# Patient Record
Sex: FEMALE | Race: WHITE | NOT HISPANIC OR LATINO | URBAN - METROPOLITAN AREA
[De-identification: names, ages, dates, MRNs, and addresses within clinical notes are randomized per-mention and may not be internally consistent; named-entity substitution may affect disease eponyms.]

---

## 2017-02-20 ENCOUNTER — PROCEDURE VISIT (OUTPATIENT)
Dept: OBSTETRICS AND GYNECOLOGY | Facility: CLINIC | Age: 27
End: 2017-02-20

## 2017-02-20 ENCOUNTER — OFFICE VISIT (OUTPATIENT)
Dept: OBSTETRICS AND GYNECOLOGY | Facility: CLINIC | Age: 27
End: 2017-02-20

## 2017-02-20 VITALS
WEIGHT: 293 LBS | BODY MASS INDEX: 44.41 KG/M2 | HEIGHT: 68 IN | DIASTOLIC BLOOD PRESSURE: 80 MMHG | SYSTOLIC BLOOD PRESSURE: 130 MMHG

## 2017-02-20 DIAGNOSIS — O99.330 TOBACCO SMOKING AFFECTING PREGNANCY: ICD-10-CM

## 2017-02-20 DIAGNOSIS — Z36.87 UNSURE OF LMP (LAST MENSTRUAL PERIOD) AS REASON FOR ULTRASOUND SCAN: ICD-10-CM

## 2017-02-20 DIAGNOSIS — Z98.891 HISTORY OF CESAREAN DELIVERY: ICD-10-CM

## 2017-02-20 DIAGNOSIS — Z34.80 ENCOUNTER FOR SUPERVISION OF OTHER NORMAL PREGNANCY: ICD-10-CM

## 2017-02-20 DIAGNOSIS — Z34.81 PRENATAL CARE, SUBSEQUENT PREGNANCY, FIRST TRIMESTER: ICD-10-CM

## 2017-02-20 DIAGNOSIS — O99.210 OBESITY AFFECTING PREGNANCY: ICD-10-CM

## 2017-02-20 DIAGNOSIS — Z13.9 SCREENING: ICD-10-CM

## 2017-02-20 DIAGNOSIS — Z01.419 WELL FEMALE EXAM WITH ROUTINE GYNECOLOGICAL EXAM: Primary | ICD-10-CM

## 2017-02-20 DIAGNOSIS — Z11.3 SCREEN FOR STD (SEXUALLY TRANSMITTED DISEASE): ICD-10-CM

## 2017-02-20 DIAGNOSIS — Z12.4 PAP SMEAR FOR CERVICAL CANCER SCREENING: ICD-10-CM

## 2017-02-20 PROBLEM — Z34.90 SUPERVISION OF NORMAL PREGNANCY: Status: ACTIVE | Noted: 2017-02-20

## 2017-02-20 LAB
B-HCG UR QL: POSITIVE
BILIRUB BLD-MCNC: ABNORMAL MG/DL
CBC, PLATELET CT, AND DIFF: (no result)
CLARITY, POC: CLEAR
COLOR UR: YELLOW
EXTERNAL CYSTIC FIBROSIS: NORMAL
EXTERNAL GC/CHLAMYDIA: NORMAL
EXTERNAL HEPATITIS B SURFACE ANTIGEN: NEGATIVE
EXTERNAL HEPATITIS C AB: NEGATIVE
EXTERNAL RUBELLA QUALITATIVE: NORMAL
EXTERNAL SYPHILIS RPR SCREEN: NEGATIVE
EXTERNAL THINPREP: NEGATIVE
EXTERNAL URINE CULTURE: NORMAL
GLUCOSE UR STRIP-MCNC: NEGATIVE MG/DL
HEMOGLOBIN FRACTIONATION: NORMAL
HIV1 AB SPEC QL IA.RAPID: NEGATIVE
INTERNAL NEGATIVE CONTROL: NEGATIVE
INTERNAL POSITIVE CONTROL: POSITIVE
KETONES UR QL: NEGATIVE
LEUKOCYTE EST, POC: ABNORMAL
Lab: ABNORMAL
NITRITE UR-MCNC: NEGATIVE MG/ML
PH UR: 7 [PH] (ref 5–8)
PROT UR STRIP-MCNC: NEGATIVE MG/DL
RBC # UR STRIP: NEGATIVE /UL
SP GR UR: 1.02 (ref 1–1.03)
UROBILINOGEN UR QL: NORMAL
VZV IGG SER QL: NORMAL

## 2017-02-20 PROCEDURE — 81002 URINALYSIS NONAUTO W/O SCOPE: CPT | Performed by: OBSTETRICS & GYNECOLOGY

## 2017-02-20 PROCEDURE — 76817 TRANSVAGINAL US OBSTETRIC: CPT | Performed by: OBSTETRICS & GYNECOLOGY

## 2017-02-20 PROCEDURE — 99395 PREV VISIT EST AGE 18-39: CPT | Performed by: OBSTETRICS & GYNECOLOGY

## 2017-02-20 PROCEDURE — 81025 URINE PREGNANCY TEST: CPT | Performed by: OBSTETRICS & GYNECOLOGY

## 2017-02-20 NOTE — PROGRESS NOTES
[facility]  Ultrasound Note     2017    Patient:  Debra Moreno      MR#:[id number]    26 y.o.      Patient Active Problem List   Diagnosis   • Well female exam with routine gynecological exam   • Supervision of normal pregnancy   • Tobacco smoking affecting pregnancy   • History of  delivery   • Obesity affecting pregnancy       [See the scanned report in the media tab for more details]    Impression    1.  Intrauterine pregnancy at 10w3d  2.  EDC 9/15/17 by LMP c/w US  3.  Viable fetus heart rate 165    Angel Barrios MD  2017 1:11 PM

## 2017-02-20 NOTE — PROGRESS NOTES
[facility]  Routine Annual Visit    2017    Patient: Debra Moreno          MR#:[id number]      History of Present Illness    26 y.o. female  who presents for annual exam.    Patient here today for confirmation of pregnancy.  Last menstrual period reportedly 16 with an EDC 9/15/17 making her 10 weeks and 3 days.  Patient with associated symptoms of nausea without a lot of vomiting and breast tenderness    Patient's last menstrual period was 2016 (exact date).  Obstetric History:  OB History      Para Term  AB TAB SAB Ectopic Multiple Living    2 1 1       1         Menstrual History:     Patient's last menstrual period was 2016 (exact date).       Sexual History:       ________________________________________  Patient Active Problem List   Diagnosis   • Well female exam with routine gynecological exam   • Supervision of normal pregnancy   • Tobacco smoking affecting pregnancy   • History of  delivery   • Obesity affecting pregnancy       Past Medical History   Diagnosis Date   • Anemia    • Bacterial vaginal infection 10/2015   • GC (gonococcus infection)    • Hypertension 2012     GESTATIONAL HYPERTENSION   • Obesity        Past Surgical History   Procedure Laterality Date   •  section         History   Smoking Status   • Current Every Day Smoker   • Packs/day: 0.50   • Types: Cigarettes   Smokeless Tobacco   • Never Used       Family History   Problem Relation Age of Onset   • Hypertension Maternal Grandmother    • Hypertension Maternal Grandfather    • Diabetes Other    • Diabetes Paternal Grandfather        Prior to Admission medications    Medication Sig Start Date End Date Taking? Authorizing Provider   Prenatal MV-Min-Fe Fum-FA-DHA (PRENATAL 1 PO) Take 1 tablet by mouth Daily.   Yes Historical Provider, MD     ________________________________________    Current contraception: none  History of abnormal Pap smear:  "no  Family history of uterine or ovarian cancer: no  Family History of colon cancer/colon polyps: no  History of abnormal mammogram: no  History of abnormal lipids: no    The following portions of the patient's history were reviewed and updated as appropriate: allergies, current medications, past family history, past medical history, past social history, past surgical history and problem list.    Review of Systems    Pertinent items are noted in HPI.     Objective   Physical Exam    Visit Vitals   • /80   • Ht 68\" (172.7 cm)   • Wt (!) 317 lb (144 kg)   • LMP 12/09/2016 (Exact Date)   • Breastfeeding No   • BMI 48.2 kg/m2      BP Readings from Last 3 Encounters:   02/20/17 130/80      Wt Readings from Last 3 Encounters:   02/20/17 (!) 317 lb (144 kg)        BMI: Estimated body mass index is 48.2 kg/(m^2) as calculated from the following:    Height as of this encounter: 68\" (172.7 cm).    Weight as of this encounter: 317 lb (144 kg).      Is Debra Moreno ready to make a healthy lifestyle change today? Yes, readiness to change = 6    The priority health goal(s) that the family would like to work on are: eat more fruits and vegetables, decrease soda or juice intake, increase water intake, increase physical activity, reduce screen time, reduce portion size and cut out extra servings    The family and Debra Moreno are at a Yes, readiness to change = 5 on the Confidence scale.        General:   alert, appears stated age and cooperative   Heart: regular rate and rhythm, S1, S2 normal, no murmur, click, rub or gallop   Lungs: clear to auscultation bilaterally   Abdomen: soft, non-tender, without masses or organomegaly and obese   Breast: inspection negative, no nipple discharge or bleeding, no masses or nodularity palpable   Vulva: normal   Vagina: normal mucosa   Cervix: no lesions   Uterus: normal size or marked limited habitus   Adnexa: exam limited by habitus     Assessment:    1.  Normal annual exam "   2.  Pregnancy with undetermined viability  3.  Morbid obesity  4.  History of  section  5.  Tobacco use affecting pregnancy-counseled on reduction or cessation      Plan:    []  Rx:   []  Mammogram request made  [x]  PAP done  []  Occult fecal blood test (Insure)  [x]  Labs: Prenatal lab panel, dating ultrasound for viability   [x]  GC/Chl/TV  []  DEXA scan   []  Referral for colonoscopy:   []  Encouraged/discussed daily MELY Barrios MD  2017 11:27 AM

## 2017-02-21 LAB
ABO GROUP BLD: NORMAL
BLD GP AB SCN SERPL QL: NEGATIVE
ERYTHROCYTE [DISTWIDTH] IN BLOOD BY AUTOMATED COUNT: 13.3 % (ref 12.3–15.4)
HBA1C MFR BLD: 5.7 % (ref 4.8–5.6)
HBV SURFACE AG SERPL QL IA: NEGATIVE
HCT VFR BLD AUTO: 39.4 % (ref 34–46.6)
HCV AB S/CO SERPL IA: <0.1 S/CO RATIO (ref 0–0.9)
HGB A MFR BLD: 97.7 % (ref 94–98)
HGB A2 MFR BLD COLUMN CHROM: 2.3 % (ref 0.7–3.1)
HGB BLD-MCNC: 13.3 G/DL (ref 11.1–15.9)
HGB C MFR BLD: 0 %
HGB F MFR BLD: 0 % (ref 0–2)
HGB FRACT BLD-IMP: NORMAL
HGB S BLD QL SOLY: NEGATIVE
HGB S MFR BLD: 0 %
HIV 1+2 AB+HIV1 P24 AG SERPL QL IA: NON REACTIVE
MCH RBC QN AUTO: 29.1 PG (ref 26.6–33)
MCHC RBC AUTO-ENTMCNC: 33.8 G/DL (ref 31.5–35.7)
MCV RBC AUTO: 86 FL (ref 79–97)
PLATELET # BLD AUTO: 326 X10E3/UL (ref 150–379)
RBC # BLD AUTO: 4.57 X10E6/UL (ref 3.77–5.28)
RH BLD: POSITIVE
RPR SER QL: NON REACTIVE
RUBV IGG SERPL IA-ACNC: 12.3 INDEX
VZV IGG SER IA-ACNC: 305 INDEX
WBC # BLD AUTO: 9.1 X10E3/UL (ref 3.4–10.8)

## 2017-02-22 ENCOUNTER — TELEPHONE (OUTPATIENT)
Dept: OBSTETRICS AND GYNECOLOGY | Facility: CLINIC | Age: 27
End: 2017-02-22

## 2017-02-22 LAB
BACTERIA UR CULT: NORMAL
BACTERIA UR CULT: NORMAL
C TRACH RRNA SPEC QL NAA+PROBE: NEGATIVE
CONV .: NORMAL
CYTOLOGIST CVX/VAG CYTO: NORMAL
CYTOLOGY CVX/VAG DOC THIN PREP: NORMAL
DX ICD CODE: NORMAL
HIV 1 & 2 AB SER-IMP: NORMAL
N GONORRHOEA RRNA SPEC QL NAA+PROBE: NEGATIVE
OTHER STN SPEC: NORMAL
PATH REPORT.FINAL DX SPEC: NORMAL
STAT OF ADQ CVX/VAG CYTO-IMP: NORMAL
T VAGINALIS RRNA SPEC QL NAA+PROBE: NEGATIVE

## 2017-02-22 NOTE — TELEPHONE ENCOUNTER
----- Message from Angel Barrios MD sent at 2/22/2017 11:39 AM EST -----  Call pt:  PAP is negative.

## 2017-02-23 ENCOUNTER — TELEPHONE (OUTPATIENT)
Dept: OBSTETRICS AND GYNECOLOGY | Facility: CLINIC | Age: 27
End: 2017-02-23

## 2017-03-22 ENCOUNTER — INITIAL PRENATAL (OUTPATIENT)
Dept: OBSTETRICS AND GYNECOLOGY | Facility: CLINIC | Age: 27
End: 2017-03-22

## 2017-03-22 VITALS — BODY MASS INDEX: 48.2 KG/M2 | DIASTOLIC BLOOD PRESSURE: 68 MMHG | WEIGHT: 293 LBS | SYSTOLIC BLOOD PRESSURE: 118 MMHG

## 2017-03-22 DIAGNOSIS — Z34.82 ENCOUNTER FOR SUPERVISION OF OTHER NORMAL PREGNANCY IN SECOND TRIMESTER: Primary | ICD-10-CM

## 2017-03-22 DIAGNOSIS — Z98.891 HISTORY OF CESAREAN DELIVERY: ICD-10-CM

## 2017-03-22 DIAGNOSIS — O99.210 OBESITY AFFECTING PREGNANCY: ICD-10-CM

## 2017-03-22 DIAGNOSIS — O99.330 TOBACCO SMOKING AFFECTING PREGNANCY: ICD-10-CM

## 2017-03-22 DIAGNOSIS — Z13.9 SCREENING: ICD-10-CM

## 2017-03-22 LAB
BILIRUB BLD-MCNC: NEGATIVE MG/DL
CLARITY, POC: CLEAR
COLOR UR: YELLOW
GLUCOSE UR STRIP-MCNC: NEGATIVE MG/DL
KETONES UR QL: NEGATIVE
LEUKOCYTE EST, POC: ABNORMAL
NITRITE UR-MCNC: NEGATIVE MG/ML
PH UR: 7 [PH] (ref 5–8)
PROT UR STRIP-MCNC: NEGATIVE MG/DL
RBC # UR STRIP: NEGATIVE /UL
SP GR UR: 1.01 (ref 1–1.03)
UROBILINOGEN UR QL: NORMAL

## 2017-03-22 PROCEDURE — 99214 OFFICE O/P EST MOD 30 MIN: CPT | Performed by: OBSTETRICS & GYNECOLOGY

## 2017-03-22 PROCEDURE — 81002 URINALYSIS NONAUTO W/O SCOPE: CPT | Performed by: OBSTETRICS & GYNECOLOGY

## 2017-03-22 RX ORDER — OMEPRAZOLE 40 MG/1
40 CAPSULE, DELAYED RELEASE ORAL DAILY
Qty: 30 CAPSULE | Refills: 4 | Status: SHIPPED | OUTPATIENT
Start: 2017-03-22 | End: 2017-04-21

## 2017-03-22 NOTE — PROGRESS NOTES
Cc/no/c/o/sc heart burn      HPI: 26 y.o.  at 14w5d with complaint of moderate intermittent heart burn the last 2-1/2 weeks.    Social history complicated by continued tobacco use exacerbating GERD-cessation encouraged    [x]  Vitals reviewed    ROS:  GI:  Negative, + reflux   Pulmonary: Negative     A/P  1. Intrauterine pregnancy at 14w5d  2. Obesity affecting pregnancy  3. Gastroesophageal reflux (new)  Rx omeprazole   4.  History of  section  5.  Tobacco use affecting pregnancy encourage cessation      Assessment/Plan   Patient Active Problem List   Diagnosis Code   • Well female exam with routine gynecological exam Z01.419   • Supervision of normal pregnancy Z34.90   • Tobacco smoking affecting pregnancy O99.330   • History of  delivery Z98.891   • Obesity affecting pregnancy O99.210, E66.9       PLAN:     OB intake completed  [x]  Reviewed dating  [x]  Reviewed medical history  [x]  Reviewed genetic and infection history  [x]  Obstetrical history reviewed/delivery notes requested.

## 2017-04-21 ENCOUNTER — PROCEDURE VISIT (OUTPATIENT)
Dept: OBSTETRICS AND GYNECOLOGY | Facility: CLINIC | Age: 27
End: 2017-04-21

## 2017-04-21 ENCOUNTER — ROUTINE PRENATAL (OUTPATIENT)
Dept: OBSTETRICS AND GYNECOLOGY | Facility: CLINIC | Age: 27
End: 2017-04-21

## 2017-04-21 VITALS — DIASTOLIC BLOOD PRESSURE: 78 MMHG | SYSTOLIC BLOOD PRESSURE: 130 MMHG | BODY MASS INDEX: 48.2 KG/M2 | WEIGHT: 293 LBS

## 2017-04-21 DIAGNOSIS — O99.210 OBESITY AFFECTING PREGNANCY: ICD-10-CM

## 2017-04-21 DIAGNOSIS — O99.330 TOBACCO SMOKING AFFECTING PREGNANCY: ICD-10-CM

## 2017-04-21 DIAGNOSIS — Z98.891 HISTORY OF CESAREAN DELIVERY: ICD-10-CM

## 2017-04-21 DIAGNOSIS — Z34.82 ENCOUNTER FOR SUPERVISION OF OTHER NORMAL PREGNANCY IN SECOND TRIMESTER: Primary | ICD-10-CM

## 2017-04-21 DIAGNOSIS — Z13.9 SCREENING: ICD-10-CM

## 2017-04-21 DIAGNOSIS — Z36.89 SCREENING, ANTENATAL, FOR FETAL ANATOMIC SURVEY: ICD-10-CM

## 2017-04-21 LAB
ABDOMINAL CIRCUMFERENCE: NORMAL MM
ABDOMINAL CIRCUMFERENCE: NORMAL MM
BILIRUB BLD-MCNC: NEGATIVE MG/DL
CLARITY, POC: CLEAR
COLOR UR: YELLOW
EXTERNAL GENETIC TESTING, MATERNAL BLOOD: NORMAL
EXTERNAL NIPT: NORMAL
FEMUR LENGTH: NORMAL MM
FEMUR LENGTH: NORMAL MM
FET BPD.MEAN FROM CEREB DIAM: NORMAL MM
FET BPD.MEAN FROM CEREB DIAM: NORMAL MM
GLUCOSE UR STRIP-MCNC: NEGATIVE MG/DL
HEAD CIRCUMFERENCE: NORMAL MM
HEAD CIRCUMFERENCE: NORMAL MM
KETONES UR QL: NEGATIVE
LEUKOCYTE EST, POC: ABNORMAL
NITRITE UR-MCNC: NEGATIVE MG/ML
PH UR: 7.5 [PH] (ref 5–8)
PROT UR STRIP-MCNC: NEGATIVE MG/DL
RBC # UR STRIP: NEGATIVE /UL
SP GR UR: 1.01 (ref 1–1.03)
UROBILINOGEN UR QL: NORMAL

## 2017-04-21 PROCEDURE — 81002 URINALYSIS NONAUTO W/O SCOPE: CPT | Performed by: OBSTETRICS & GYNECOLOGY

## 2017-04-21 PROCEDURE — 99214 OFFICE O/P EST MOD 30 MIN: CPT | Performed by: OBSTETRICS & GYNECOLOGY

## 2017-04-21 PROCEDURE — 76805 OB US >/= 14 WKS SNGL FETUS: CPT | Performed by: OBSTETRICS & GYNECOLOGY

## 2017-04-21 NOTE — PROGRESS NOTES
[facility]  Ultrasound Note     2017    Patient:  Debra Moreno      MR#:[id number]    26 y.o.      Patient Active Problem List   Diagnosis   • Well female exam with routine gynecological exam   • Supervision of normal pregnancy   • Tobacco smoking affecting pregnancy   • History of  delivery   • Obesity affecting pregnancy       [See the scanned report in the media tab for more details]    Impression    1.  Intrauterine pregnancy at 19w0d  2.  Normal and completed anatomic survey  3.  Male fetus    Angel Barrios MD  2017 1:44 PM

## 2017-04-21 NOTE — PROGRESS NOTES
Cc/no c/o/ afp info iven today/sc      HPI: 26 y.o.  at 19w0d who presents for routine OB check without complaints.  Anatomic survey is completed and is within normal limits    [x]  Vitals reviewed    ROS:  GI:  Negative  Pulmonary: Negative     A/P  1.  Intrauterine pregnancy at 19w0d   2.  Obesity affecting pregnancy-awake and stable  3.  Tobacco use affecting pregnancy  4.  Previous  section-requesting  for RCS         Assessment/Plan   Patient Active Problem List   Diagnosis Code   • Well female exam with routine gynecological exam Z01.419   • Supervision of normal pregnancy Z34.90   • Tobacco smoking affecting pregnancy O99.330   • History of  delivery Z98.891   • Obesity affecting pregnancy O99.210, E66.9       PLAN:

## 2017-05-18 ENCOUNTER — ROUTINE PRENATAL (OUTPATIENT)
Dept: OBSTETRICS AND GYNECOLOGY | Facility: CLINIC | Age: 27
End: 2017-05-18

## 2017-05-18 VITALS — DIASTOLIC BLOOD PRESSURE: 78 MMHG | WEIGHT: 293 LBS | BODY MASS INDEX: 49.57 KG/M2 | SYSTOLIC BLOOD PRESSURE: 138 MMHG

## 2017-05-18 DIAGNOSIS — Z34.82 ENCOUNTER FOR SUPERVISION OF OTHER NORMAL PREGNANCY IN SECOND TRIMESTER: ICD-10-CM

## 2017-05-18 DIAGNOSIS — Z13.9 SCREENING: Primary | ICD-10-CM

## 2017-05-18 PROCEDURE — 99213 OFFICE O/P EST LOW 20 MIN: CPT | Performed by: NURSE PRACTITIONER

## 2017-05-18 PROCEDURE — 81002 URINALYSIS NONAUTO W/O SCOPE: CPT | Performed by: NURSE PRACTITIONER

## 2017-06-14 ENCOUNTER — ROUTINE PRENATAL (OUTPATIENT)
Dept: OBSTETRICS AND GYNECOLOGY | Facility: CLINIC | Age: 27
End: 2017-06-14

## 2017-06-14 VITALS — BODY MASS INDEX: 49.57 KG/M2 | DIASTOLIC BLOOD PRESSURE: 70 MMHG | SYSTOLIC BLOOD PRESSURE: 130 MMHG | WEIGHT: 293 LBS

## 2017-06-14 DIAGNOSIS — Z98.891 HISTORY OF CESAREAN DELIVERY: ICD-10-CM

## 2017-06-14 DIAGNOSIS — Z13.9 SCREENING: ICD-10-CM

## 2017-06-14 DIAGNOSIS — Z34.82 ENCOUNTER FOR SUPERVISION OF OTHER NORMAL PREGNANCY IN SECOND TRIMESTER: Primary | ICD-10-CM

## 2017-06-14 DIAGNOSIS — Z13.0 SCREENING FOR IRON DEFICIENCY ANEMIA: ICD-10-CM

## 2017-06-14 DIAGNOSIS — Z13.1 SCREENING FOR DIABETES MELLITUS: ICD-10-CM

## 2017-06-14 DIAGNOSIS — O99.330 TOBACCO SMOKING AFFECTING PREGNANCY: ICD-10-CM

## 2017-06-14 DIAGNOSIS — O99.210 OBESITY AFFECTING PREGNANCY: ICD-10-CM

## 2017-06-14 LAB
BILIRUB BLD-MCNC: ABNORMAL MG/DL
CLARITY, POC: CLEAR
COLOR UR: ABNORMAL
EXTERNAL GTT 1 HOUR: 95
GLUCOSE UR STRIP-MCNC: NEGATIVE MG/DL
KETONES UR QL: NEGATIVE
LEUKOCYTE EST, POC: ABNORMAL
NITRITE UR-MCNC: NEGATIVE MG/ML
PH UR: 7 [PH] (ref 5–8)
PROT UR STRIP-MCNC: ABNORMAL MG/DL
RBC # UR STRIP: NEGATIVE /UL
SP GR UR: 1.02 (ref 1–1.03)
UROBILINOGEN UR QL: NORMAL

## 2017-06-14 PROCEDURE — 99213 OFFICE O/P EST LOW 20 MIN: CPT | Performed by: OBSTETRICS & GYNECOLOGY

## 2017-06-14 PROCEDURE — 90471 IMMUNIZATION ADMIN: CPT | Performed by: OBSTETRICS & GYNECOLOGY

## 2017-06-14 PROCEDURE — 81002 URINALYSIS NONAUTO W/O SCOPE: CPT | Performed by: OBSTETRICS & GYNECOLOGY

## 2017-06-14 PROCEDURE — 90715 TDAP VACCINE 7 YRS/> IM: CPT | Performed by: OBSTETRICS & GYNECOLOGY

## 2017-06-14 NOTE — PROGRESS NOTES
Cc/ hand and feet edma, glucose H/H tdap today      HPI: 26 y.o.  at 26w5d with complaint of intermittent mild to moderate lower extremity swelling for the past week and a half.  The patient states the fetus is active and no contractions    [x]  Vitals reviewed    ROS:  GI:  Negative  Pulmonary: Negative   :  Active fetus       A/P  1. Intrauterine pregnancy at 26w5d     Assessment/Plan   Patient Active Problem List   Diagnosis Code   • Well female exam with routine gynecological exam Z01.419   • Supervision of normal pregnancy Z34.90   • Tobacco smoking affecting pregnancy O99.330   • History of  delivery Z98.891   • Obesity affecting pregnancy O99.210, E66.9       PLAN:     Angel Barrios MD  2017 12:07 PM

## 2017-06-15 ENCOUNTER — TELEPHONE (OUTPATIENT)
Dept: OBSTETRICS AND GYNECOLOGY | Facility: CLINIC | Age: 27
End: 2017-06-15

## 2017-06-15 LAB
GLUCOSE 1H P 50 G GLC PO SERPL-MCNC: 95 MG/DL (ref 65–139)
HCT VFR BLD AUTO: 32.8 % (ref 34–46.6)
HGB BLD-MCNC: 10.7 G/DL (ref 11.1–15.9)

## 2017-06-15 NOTE — TELEPHONE ENCOUNTER
----- Message from Angel Barrios MD sent at 6/15/2017  7:29 AM EDT -----  Call pt:  1 hour GTT is Negative    Mild anemia is noted.  Add Fe supplement

## 2017-06-16 ENCOUNTER — TELEPHONE (OUTPATIENT)
Dept: OBSTETRICS AND GYNECOLOGY | Facility: CLINIC | Age: 27
End: 2017-06-16

## 2017-06-19 ENCOUNTER — TELEPHONE (OUTPATIENT)
Dept: OBSTETRICS AND GYNECOLOGY | Facility: CLINIC | Age: 27
End: 2017-06-19

## 2017-07-11 ENCOUNTER — ROUTINE PRENATAL (OUTPATIENT)
Dept: OBSTETRICS AND GYNECOLOGY | Facility: CLINIC | Age: 27
End: 2017-07-11

## 2017-07-11 VITALS — BODY MASS INDEX: 50.48 KG/M2 | DIASTOLIC BLOOD PRESSURE: 72 MMHG | SYSTOLIC BLOOD PRESSURE: 126 MMHG | WEIGHT: 293 LBS

## 2017-07-11 DIAGNOSIS — O99.210 OBESITY AFFECTING PREGNANCY: ICD-10-CM

## 2017-07-11 DIAGNOSIS — Z34.83 PRENATAL CARE, SUBSEQUENT PREGNANCY, THIRD TRIMESTER: Primary | ICD-10-CM

## 2017-07-11 DIAGNOSIS — Z98.891 HISTORY OF CESAREAN DELIVERY: ICD-10-CM

## 2017-07-11 DIAGNOSIS — Z13.9 SCREENING: ICD-10-CM

## 2017-07-11 DIAGNOSIS — Z30.2 REQUEST FOR STERILIZATION: ICD-10-CM

## 2017-07-11 DIAGNOSIS — O99.330 TOBACCO SMOKING AFFECTING PREGNANCY: ICD-10-CM

## 2017-07-11 PROBLEM — Z01.419 WELL FEMALE EXAM WITH ROUTINE GYNECOLOGICAL EXAM: Status: RESOLVED | Noted: 2017-02-20 | Resolved: 2017-07-11

## 2017-07-11 LAB
BILIRUB BLD-MCNC: ABNORMAL MG/DL
CLARITY, POC: ABNORMAL
COLOR UR: ABNORMAL
GLUCOSE UR STRIP-MCNC: NEGATIVE MG/DL
KETONES UR QL: NEGATIVE
LEUKOCYTE EST, POC: ABNORMAL
NITRITE UR-MCNC: NEGATIVE MG/ML
PH UR: 7 [PH] (ref 5–8)
PROT UR STRIP-MCNC: ABNORMAL MG/DL
RBC # UR STRIP: NEGATIVE /UL
SP GR UR: 1 (ref 1–1.03)
UROBILINOGEN UR QL: NORMAL

## 2017-07-11 PROCEDURE — 81002 URINALYSIS NONAUTO W/O SCOPE: CPT | Performed by: OBSTETRICS & GYNECOLOGY

## 2017-07-11 PROCEDURE — 99406 BEHAV CHNG SMOKING 3-10 MIN: CPT | Performed by: OBSTETRICS & GYNECOLOGY

## 2017-07-11 PROCEDURE — 99213 OFFICE O/P EST LOW 20 MIN: CPT | Performed by: OBSTETRICS & GYNECOLOGY

## 2017-07-11 RX ORDER — CHOLECALCIFEROL (VITAMIN D3) 25 MCG
TABLET,CHEWABLE ORAL
Refills: 6 | COMMUNITY
Start: 2017-06-16 | End: 2017-09-14

## 2017-07-11 NOTE — PROGRESS NOTES
pt states swelling    HPI: 26 y.o.  at 30w4d - pt states swelling - denies loss of fluid, vaginal bleeding, ctx  +fM   [x]  Vitals reviewed    ROS:  GI:  Negative  Pulmonary: Negative     A/P  1. Intrauterine pregnancy at 30w4d     Assessment/Plan   Patient Active Problem List   Diagnosis Code   • Supervision of normal pregnancy Z34.90   • Tobacco smoking affecting pregnancy O99.330   • History of  delivery Z98.891   • Obesity affecting pregnancy O99.210, E66.9       PLAN:   Obesity - check growth next visit   Prev csection - repeat at 39 weeks   Request for sterilization  - tubal papers today   Tobacco use - I advised the patient of the risks in continuing to use tobacco, and I provided this patient with smoking cessation educational materials.  I also discussed how to quit smoking and the patient has expressed the willingness to quit.      During this visit, I spent 3-10 mintues counseling the patient regarding smoking cessation.   Labor warnings - FKC   Mild anemia - cont PNV        Rosario Kearns MD  2017 12:32 PM

## 2017-07-27 ENCOUNTER — ROUTINE PRENATAL (OUTPATIENT)
Dept: OBSTETRICS AND GYNECOLOGY | Facility: CLINIC | Age: 27
End: 2017-07-27

## 2017-07-27 ENCOUNTER — PROCEDURE VISIT (OUTPATIENT)
Dept: OBSTETRICS AND GYNECOLOGY | Facility: CLINIC | Age: 27
End: 2017-07-27

## 2017-07-27 VITALS — DIASTOLIC BLOOD PRESSURE: 76 MMHG | SYSTOLIC BLOOD PRESSURE: 134 MMHG | WEIGHT: 293 LBS | BODY MASS INDEX: 50.09 KG/M2

## 2017-07-27 DIAGNOSIS — Z34.83 PRENATAL CARE, SUBSEQUENT PREGNANCY, THIRD TRIMESTER: Primary | ICD-10-CM

## 2017-07-27 DIAGNOSIS — O99.330 TOBACCO SMOKING AFFECTING PREGNANCY: ICD-10-CM

## 2017-07-27 DIAGNOSIS — O99.210 OBESITY AFFECTING PREGNANCY: ICD-10-CM

## 2017-07-27 LAB
BILIRUB BLD-MCNC: NEGATIVE MG/DL
GLUCOSE UR STRIP-MCNC: NEGATIVE MG/DL
KETONES UR QL: NEGATIVE
LEUKOCYTE EST, POC: ABNORMAL
NITRITE UR-MCNC: NEGATIVE MG/ML
PH UR: 7 [PH] (ref 5–8)
PROT UR STRIP-MCNC: NEGATIVE MG/DL
RBC # UR STRIP: NEGATIVE /UL
SP GR UR: 1.02 (ref 1–1.03)
UROBILINOGEN UR QL: NORMAL

## 2017-07-27 PROCEDURE — 76816 OB US FOLLOW-UP PER FETUS: CPT | Performed by: OBSTETRICS & GYNECOLOGY

## 2017-07-27 PROCEDURE — 99213 OFFICE O/P EST LOW 20 MIN: CPT | Performed by: NURSE PRACTITIONER

## 2017-07-27 NOTE — PROGRESS NOTES
HPI: 26 y.o.  at 32w6d presents for routine OB care without complaints. Growth scan today. EFW 57%. BIN 16.9. Good fetal movement. No complaints.     [x]  Vitals reviewed    ROS:  GI:  Negative  : Active fetus   Pulmonary: Negative     A/P  1. Intrauterine pregnancy at 32w6d     Assessment/Plan   Encounter Diagnosis   Name Primary?   • Prenatal care, subsequent pregnancy, third trimester Yes       PLAN:   Return in about 2 weeks (around 8/10/2017).  1. Obesity- growth scan today  2. Previous csection- repeat scheduled at 39 weeks  3. PTL s/s and kick counts reinforced    Carlota Jauregui, APRN  2017 2:53 PM

## 2017-08-09 ENCOUNTER — ROUTINE PRENATAL (OUTPATIENT)
Dept: OBSTETRICS AND GYNECOLOGY | Facility: CLINIC | Age: 27
End: 2017-08-09

## 2017-08-09 VITALS — WEIGHT: 293 LBS | BODY MASS INDEX: 50.02 KG/M2 | DIASTOLIC BLOOD PRESSURE: 78 MMHG | SYSTOLIC BLOOD PRESSURE: 142 MMHG

## 2017-08-09 DIAGNOSIS — Z30.2 REQUEST FOR STERILIZATION: ICD-10-CM

## 2017-08-09 DIAGNOSIS — O99.210 OBESITY AFFECTING PREGNANCY: ICD-10-CM

## 2017-08-09 DIAGNOSIS — Z98.891 HISTORY OF CESAREAN DELIVERY: ICD-10-CM

## 2017-08-09 DIAGNOSIS — Z34.83 ENCOUNTER FOR SUPERVISION OF OTHER NORMAL PREGNANCY IN THIRD TRIMESTER: Primary | ICD-10-CM

## 2017-08-09 DIAGNOSIS — O99.330 TOBACCO SMOKING AFFECTING PREGNANCY: ICD-10-CM

## 2017-08-09 DIAGNOSIS — Z13.9 SCREENING: ICD-10-CM

## 2017-08-09 PROBLEM — O13.3 GESTATIONAL HYPERTENSION W/O SIGNIFICANT PROTEINURIA IN 3RD TRIMESTER: Status: ACTIVE | Noted: 2017-08-09

## 2017-08-09 LAB
BILIRUB BLD-MCNC: NEGATIVE MG/DL
CLARITY, POC: CLEAR
COLOR UR: YELLOW
GLUCOSE UR STRIP-MCNC: NEGATIVE MG/DL
KETONES UR QL: ABNORMAL
LEUKOCYTE EST, POC: ABNORMAL
NITRITE UR-MCNC: NEGATIVE MG/ML
PH UR: 7.5 [PH] (ref 5–8)
PROT UR STRIP-MCNC: ABNORMAL MG/DL
RBC # UR STRIP: ABNORMAL /UL
SP GR UR: 1.02 (ref 1–1.03)
UROBILINOGEN UR QL: NORMAL

## 2017-08-09 PROCEDURE — 99214 OFFICE O/P EST MOD 30 MIN: CPT | Performed by: OBSTETRICS & GYNECOLOGY

## 2017-08-09 NOTE — PROGRESS NOTES
CC: pt states she is having some constipation. Pt states she finally had a bowel movement after 2 days. [facility]      HPI: 26 y.o.  at 34w5d presents generally feeling well without complaints.  She does state an episode of constipation 4 days ago that recently resolved.  Symptoms were moderate and intermittent.    Patient had a vertical incision performed by Dr. Gupta.  Discussed pfannenstiel incision for this surgical procedure.    [x]  Vitals reviewed    ROS:  GI:  Negative, constipation - resolved  : active fetus, no contractions   Pulmonary: Negative   Neuro:  Headaches visual changes    A/P  1. Intrauterine pregnancy at 34w5d   2. Pregnancy Risk:  HIGH RISK  3.  Gestational hypertension-repeat pressure 126/68, feels well with no symptoms      Assessment/Plan   Encounter Diagnoses   Name Primary?   • Screening    • Encounter for supervision of other normal pregnancy in third trimester Yes   • History of  delivery    • Obesity affecting pregnancy    • Tobacco smoking affecting pregnancy    • Request for sterilization        PLAN:   Return for ob check.      Angel Barrios MD  2017 1:15 PM

## 2017-08-17 ENCOUNTER — ROUTINE PRENATAL (OUTPATIENT)
Dept: OBSTETRICS AND GYNECOLOGY | Facility: CLINIC | Age: 27
End: 2017-08-17

## 2017-08-17 VITALS — SYSTOLIC BLOOD PRESSURE: 122 MMHG | BODY MASS INDEX: 50.42 KG/M2 | WEIGHT: 293 LBS | DIASTOLIC BLOOD PRESSURE: 68 MMHG

## 2017-08-17 DIAGNOSIS — Z36.85 ANTENATAL SCREENING FOR STREPTOCOCCUS B: ICD-10-CM

## 2017-08-17 DIAGNOSIS — Z13.9 SCREENING: Primary | ICD-10-CM

## 2017-08-17 DIAGNOSIS — Z34.83 PRENATAL CARE, SUBSEQUENT PREGNANCY, THIRD TRIMESTER: ICD-10-CM

## 2017-08-17 LAB
BILIRUB BLD-MCNC: NEGATIVE MG/DL
CLARITY, POC: CLEAR
COLOR UR: YELLOW
EXTERNAL GROUP B STREP ANTIGEN: NEGATIVE
GLUCOSE UR STRIP-MCNC: NEGATIVE MG/DL
KETONES UR QL: NEGATIVE
LEUKOCYTE EST, POC: ABNORMAL
NITRITE UR-MCNC: NEGATIVE MG/ML
PH UR: 7 [PH] (ref 5–8)
PROT UR STRIP-MCNC: NEGATIVE MG/DL
RBC # UR STRIP: ABNORMAL /UL
SP GR UR: 1.02 (ref 1–1.03)
UROBILINOGEN UR QL: NORMAL

## 2017-08-17 PROCEDURE — 99212 OFFICE O/P EST SF 10 MIN: CPT | Performed by: NURSE PRACTITIONER

## 2017-08-17 NOTE — PROGRESS NOTES
HPI: 26 y.o.  at 35w6d presents for routine OB care without complaints. GBS today. Denies ctx, lof, bleeding. Fetus very active per pt.     [x]  Vitals reviewed    ROS:  GI:  Negative  : active fetus  Pulmonary: Negative     A/P  1. Intrauterine pregnancy at 35w6d       Assessment/Plan   Encounter Diagnoses   Name Primary?   • Screening Yes   •  screening for streptococcus B    • Prenatal care, subsequent pregnancy, third trimester        PLAN:   Return in about 1 week (around 2017).    Labor s/s  Kick counts encouraged  Weekly      Carlota Jauregui, APRN  2017 11:39 AM

## 2017-08-19 LAB
BACTERIA UR CULT: NORMAL
BACTERIA UR CULT: NORMAL

## 2017-08-22 LAB — B-HEM STREP SPEC QL CULT: NEGATIVE

## 2017-08-23 ENCOUNTER — TELEPHONE (OUTPATIENT)
Dept: OBSTETRICS AND GYNECOLOGY | Facility: CLINIC | Age: 27
End: 2017-08-23

## 2017-08-23 NOTE — TELEPHONE ENCOUNTER
----- Message from ÁNGEL Mercedes sent at 8/22/2017  5:00 PM EDT -----  Please let pt know her GBS culture returned negative. Thanks.

## 2017-08-25 ENCOUNTER — ROUTINE PRENATAL (OUTPATIENT)
Dept: OBSTETRICS AND GYNECOLOGY | Facility: CLINIC | Age: 27
End: 2017-08-25

## 2017-08-25 ENCOUNTER — PROCEDURE VISIT (OUTPATIENT)
Dept: OBSTETRICS AND GYNECOLOGY | Facility: CLINIC | Age: 27
End: 2017-08-25

## 2017-08-25 VITALS — DIASTOLIC BLOOD PRESSURE: 80 MMHG | WEIGHT: 293 LBS | SYSTOLIC BLOOD PRESSURE: 128 MMHG | BODY MASS INDEX: 51.09 KG/M2

## 2017-08-25 DIAGNOSIS — O99.210 OBESITY AFFECTING PREGNANCY: ICD-10-CM

## 2017-08-25 DIAGNOSIS — Z98.891 HISTORY OF CESAREAN DELIVERY: ICD-10-CM

## 2017-08-25 DIAGNOSIS — O13.3 GESTATIONAL HYPERTENSION W/O SIGNIFICANT PROTEINURIA IN 3RD TRIMESTER: ICD-10-CM

## 2017-08-25 DIAGNOSIS — R06.02 SOB (SHORTNESS OF BREATH) ON EXERTION: ICD-10-CM

## 2017-08-25 DIAGNOSIS — Z13.9 SCREENING: ICD-10-CM

## 2017-08-25 DIAGNOSIS — Z34.83 ENCOUNTER FOR SUPERVISION OF OTHER NORMAL PREGNANCY IN THIRD TRIMESTER: Primary | ICD-10-CM

## 2017-08-25 DIAGNOSIS — O99.330 TOBACCO SMOKING AFFECTING PREGNANCY: ICD-10-CM

## 2017-08-25 DIAGNOSIS — Z30.2 REQUEST FOR STERILIZATION: ICD-10-CM

## 2017-08-25 LAB
BILIRUB BLD-MCNC: NEGATIVE MG/DL
CLARITY, POC: CLEAR
COLOR UR: ABNORMAL
GLUCOSE UR STRIP-MCNC: NEGATIVE MG/DL
KETONES UR QL: ABNORMAL
LEUKOCYTE EST, POC: ABNORMAL
NITRITE UR-MCNC: NEGATIVE MG/ML
PH UR: 6.5 [PH] (ref 5–8)
PROT UR STRIP-MCNC: ABNORMAL MG/DL
RBC # UR STRIP: NEGATIVE /UL
SP GR UR: 1.02 (ref 1–1.03)
UROBILINOGEN UR QL: NORMAL

## 2017-08-25 PROCEDURE — 99214 OFFICE O/P EST MOD 30 MIN: CPT | Performed by: OBSTETRICS & GYNECOLOGY

## 2017-08-25 PROCEDURE — 76819 FETAL BIOPHYS PROFIL W/O NST: CPT | Performed by: OBSTETRICS & GYNECOLOGY

## 2017-08-25 PROCEDURE — 76816 OB US FOLLOW-UP PER FETUS: CPT | Performed by: OBSTETRICS & GYNECOLOGY

## 2017-08-25 NOTE — PROGRESS NOTES
Cc/ hip pain/ allergies/smoker says she is short of air today/sc      HPI: 26 y.o.  at 37w0d complaint of intermittent shortness of breath that moderate for the past 4-5 days with an associated nonproductive cough.    [x]  Vitals reviewed  Sats:  98%, pulse 92      ROS:  GI:  Negative  : +FM, occ contractions  Pulmonary: +SOA    A/P  1. Intrauterine pregnancy at 37w0d   2. Pregnancy Risk:  HIGH RISK  Debra was seen today for routine prenatal visit.    Diagnoses and all orders for this visit:    Encounter for supervision of other normal pregnancy in third trimester    Screening  -     POC Urinalysis Dipstick    Gestational hypertension w/o significant proteinuria in 3rd trimester  - Blood pressure stable without symptoms of preeclampsia    History of  delivery  -  Repeat is scheduled  -  Patient with history of wound infection    Obesity affecting pregnancy    Request for sterilization    Tobacco smoking affecting pregnancy  - Cessation and reduction have been discussed on multiple occasions    Shortness of air  -  Sats pulse peak flow all within normal limits  -  Lungs clear to auscultation      PLAN:   Return in about 1 week (around 2017) for OB check and BPP.      Angel Barrios MD  2017 1:33 PM

## 2017-08-25 NOTE — PROGRESS NOTES
[facility]  Ultrasound Note     2017    Patient:  Debra Moreno      MR#:[id number]    26 y.o.      Patient Active Problem List   Diagnosis   • Supervision of normal pregnancy   • Tobacco smoking affecting pregnancy   • History of  delivery   • Obesity affecting pregnancy   • Request for sterilization   • Gestational hypertension w/o significant proteinuria in 3rd trimester       [See the scanned report in the media tab for more details]    Impression    1.  Intrauterine pregnancy at 37w0d  2.  Normal and completed anatomic survey  3.  Normal Interval Growth with EFW at 41 % and AC at 17%  4.  Reassuring biophysical profile , BIN 8.7.    Relevant comparison data available:  [x]  Prev us            Angel Barrios MD  2017 3:11 PM

## 2017-09-01 ENCOUNTER — PROCEDURE VISIT (OUTPATIENT)
Dept: OBSTETRICS AND GYNECOLOGY | Facility: CLINIC | Age: 27
End: 2017-09-01

## 2017-09-01 ENCOUNTER — ROUTINE PRENATAL (OUTPATIENT)
Dept: OBSTETRICS AND GYNECOLOGY | Facility: CLINIC | Age: 27
End: 2017-09-01

## 2017-09-01 VITALS — WEIGHT: 293 LBS | SYSTOLIC BLOOD PRESSURE: 134 MMHG | DIASTOLIC BLOOD PRESSURE: 80 MMHG | BODY MASS INDEX: 51.24 KG/M2

## 2017-09-01 DIAGNOSIS — O13.3 GESTATIONAL HYPERTENSION W/O SIGNIFICANT PROTEINURIA IN 3RD TRIMESTER: ICD-10-CM

## 2017-09-01 DIAGNOSIS — Z13.9 SCREENING: ICD-10-CM

## 2017-09-01 DIAGNOSIS — O99.210 OBESITY AFFECTING PREGNANCY: ICD-10-CM

## 2017-09-01 DIAGNOSIS — Z30.2 REQUEST FOR STERILIZATION: ICD-10-CM

## 2017-09-01 DIAGNOSIS — O99.330 TOBACCO SMOKING AFFECTING PREGNANCY: ICD-10-CM

## 2017-09-01 DIAGNOSIS — Z34.83 ENCOUNTER FOR SUPERVISION OF OTHER NORMAL PREGNANCY IN THIRD TRIMESTER: Primary | ICD-10-CM

## 2017-09-01 DIAGNOSIS — Z98.891 HISTORY OF CESAREAN DELIVERY: ICD-10-CM

## 2017-09-01 PROCEDURE — 99214 OFFICE O/P EST MOD 30 MIN: CPT | Performed by: OBSTETRICS & GYNECOLOGY

## 2017-09-01 PROCEDURE — 76819 FETAL BIOPHYS PROFIL W/O NST: CPT | Performed by: OBSTETRICS & GYNECOLOGY

## 2017-09-01 RX ORDER — DIPHENHYDRAMINE HCL 50 MG
50 CAPSULE ORAL EVERY 6 HOURS PRN
COMMUNITY
End: 2017-09-12 | Stop reason: HOSPADM

## 2017-09-01 NOTE — PROGRESS NOTES
[facility] [place]  Ultrasound Note     2017    Patient:  Debra Moreno      MR#:[id number]    26 y.o.   for biophysical profile     Patient Active Problem List   Diagnosis   • Supervision of normal pregnancy   • Tobacco smoking affecting pregnancy   • History of  delivery   • Obesity affecting pregnancy   • Request for sterilization   • Gestational hypertension w/o significant proteinuria in 3rd trimester       [See the scanned report in the media tab for more details]    Impression    1.  Intrauterine pregnancy at 38w0d  2.  Reassuring BPP , BIN: 12.7  3.  Fetus in cephalic position    Relevant comparison data available:  [x]  None          Angel Barrios MD  2017 1:38 PM

## 2017-09-01 NOTE — PROGRESS NOTES
CC/ NO/C/O/SC    HPI: 26 y.o.  at 38w0d since generally feeling well with periodic intermittent mild contractions for the past week and a half.  She reports the fetus is active with no loss of fluid.    [x]  Vitals reviewed    ROS:  GI:  Negative  : +fm, occ contractions  Pulmonary: Negative   Neuro:  No headaches or visual changes    A/P  1. Intrauterine pregnancy at 38w0d   2. Pregnancy Risk:  HIGH RISK  Debra was seen today for routine prenatal visit.    Diagnoses and all orders for this visit:    Screening  -     POC Urinalysis Dipstick    Encounter for supervision of other normal pregnancy in third trimester    Gestational hypertension w/o significant proteinuria in 3rd trimester    History of  delivery    Obesity affecting pregnancy    Request for sterilization    Tobacco smoking affecting pregnancy            PLAN:   No Follow-up on file.      Angel Barrios MD  2017 12:39 PM

## 2017-09-08 ENCOUNTER — ANESTHESIA (OUTPATIENT)
Dept: LABOR AND DELIVERY | Facility: HOSPITAL | Age: 27
End: 2017-09-08

## 2017-09-08 ENCOUNTER — ANESTHESIA EVENT (OUTPATIENT)
Dept: LABOR AND DELIVERY | Facility: HOSPITAL | Age: 27
End: 2017-09-08

## 2017-09-08 ENCOUNTER — HOSPITAL ENCOUNTER (INPATIENT)
Facility: HOSPITAL | Age: 27
LOS: 4 days | Discharge: HOME | End: 2017-09-12
Attending: OBSTETRICS & GYNECOLOGY | Admitting: OBSTETRICS & GYNECOLOGY

## 2017-09-08 PROBLEM — Z34.90 PREGNANCY: Status: ACTIVE | Noted: 2017-09-08

## 2017-09-08 LAB
ABO GROUP BLD: NORMAL
ALBUMIN SERPL-MCNC: 3.2 G/DL (ref 3.5–5.2)
ALBUMIN/GLOB SERPL: 0.9 G/DL
ALP SERPL-CCNC: 142 U/L (ref 39–117)
ALT SERPL W P-5'-P-CCNC: 10 U/L (ref 1–33)
ANION GAP SERPL CALCULATED.3IONS-SCNC: 15.2 MMOL/L
AST SERPL-CCNC: 8 U/L (ref 1–32)
ATMOSPHERIC PRESS: 758.5 MMHG
BASE EXCESS BLDCOV CALC-SCNC: -4.5 MMOL/L (ref -30–30)
BDY SITE: ABNORMAL
BILIRUB SERPL-MCNC: 0.2 MG/DL (ref 0.1–1.2)
BLD GP AB SCN SERPL QL: NEGATIVE
BUN BLD-MCNC: 9 MG/DL (ref 6–20)
BUN/CREAT SERPL: 18.4 (ref 7–25)
CALCIUM SPEC-SCNC: 9.2 MG/DL (ref 8.6–10.5)
CHLORIDE SERPL-SCNC: 101 MMOL/L (ref 98–107)
CO2 SERPL-SCNC: 20.8 MMOL/L (ref 22–29)
CREAT BLD-MCNC: 0.49 MG/DL (ref 0.57–1)
DEPRECATED RDW RBC AUTO: 44.8 FL (ref 37–54)
ERYTHROCYTE [DISTWIDTH] IN BLOOD BY AUTOMATED COUNT: 13.8 % (ref 11.7–13)
GAS FLOW AIRWAY: 3 LPM
GFR SERPL CREATININE-BSD FRML MDRD: >150 ML/MIN/1.73
GLOBULIN UR ELPH-MCNC: 3.6 GM/DL
GLUCOSE BLD-MCNC: 95 MG/DL (ref 65–99)
HCO3 BLDCOV-SCNC: 24.8 MMOL/L
HCT VFR BLD AUTO: 34.6 % (ref 35.6–45.5)
HGB BLD-MCNC: 11.3 G/DL (ref 11.9–15.5)
MCH RBC QN AUTO: 29 PG (ref 26.9–32)
MCHC RBC AUTO-ENTMCNC: 32.7 G/DL (ref 32.4–36.3)
MCV RBC AUTO: 88.9 FL (ref 80.5–98.2)
MODALITY: ABNORMAL
PCO2 BLDCOV: 60.9 MM HG (ref 35–51.3)
PH BLDCOV: 7.22 PH UNITS (ref 7.26–7.4)
PLATELET # BLD AUTO: 478 10*3/MM3 (ref 140–500)
PMV BLD AUTO: 10.2 FL (ref 6–12)
PO2 BLDCOV: <25.2 MM HG (ref 19–39)
POTASSIUM BLD-SCNC: 3.9 MMOL/L (ref 3.5–5.2)
PROT SERPL-MCNC: 6.8 G/DL (ref 6–8.5)
RBC # BLD AUTO: 3.89 10*6/MM3 (ref 3.9–5.2)
RH BLD: POSITIVE
SAO2 % BLDCOA: 9.3 % (ref 92–99)
SAO2 % BLDCOV: ABNORMAL %
SODIUM BLD-SCNC: 137 MMOL/L (ref 136–145)
WBC NRBC COR # BLD: 15.37 10*3/MM3 (ref 4.5–10.7)

## 2017-09-08 PROCEDURE — 25010000002 GENTAMICIN PER 80 MG: Performed by: OBSTETRICS & GYNECOLOGY

## 2017-09-08 PROCEDURE — 63710000001 DIPHENHYDRAMINE PER 50 MG: Performed by: OBSTETRICS & GYNECOLOGY

## 2017-09-08 PROCEDURE — 82803 BLOOD GASES ANY COMBINATION: CPT

## 2017-09-08 PROCEDURE — 86901 BLOOD TYPING SEROLOGIC RH(D): CPT | Performed by: OBSTETRICS & GYNECOLOGY

## 2017-09-08 PROCEDURE — 25010000002 PROMETHAZINE PER 50 MG: Performed by: NURSE ANESTHETIST, CERTIFIED REGISTERED

## 2017-09-08 PROCEDURE — 59514 CESAREAN DELIVERY ONLY: CPT | Performed by: OBSTETRICS & GYNECOLOGY

## 2017-09-08 PROCEDURE — 80053 COMPREHEN METABOLIC PANEL: CPT | Performed by: OBSTETRICS & GYNECOLOGY

## 2017-09-08 PROCEDURE — 25010000002 ONDANSETRON PER 1 MG: Performed by: ANESTHESIOLOGY

## 2017-09-08 PROCEDURE — 86850 RBC ANTIBODY SCREEN: CPT | Performed by: OBSTETRICS & GYNECOLOGY

## 2017-09-08 PROCEDURE — 25010000002 MORPHINE PER 10 MG: Performed by: NURSE ANESTHETIST, CERTIFIED REGISTERED

## 2017-09-08 PROCEDURE — 25010000002 DIPHENHYDRAMINE PER 50 MG: Performed by: NURSE ANESTHETIST, CERTIFIED REGISTERED

## 2017-09-08 PROCEDURE — 25010000002 FENTANYL CITRATE (PF) 100 MCG/2ML SOLUTION: Performed by: NURSE ANESTHETIST, CERTIFIED REGISTERED

## 2017-09-08 PROCEDURE — 25010000002 PHENYLEPHRINE PER 1 ML: Performed by: NURSE ANESTHETIST, CERTIFIED REGISTERED

## 2017-09-08 PROCEDURE — 85027 COMPLETE CBC AUTOMATED: CPT | Performed by: OBSTETRICS & GYNECOLOGY

## 2017-09-08 PROCEDURE — 86900 BLOOD TYPING SEROLOGIC ABO: CPT | Performed by: OBSTETRICS & GYNECOLOGY

## 2017-09-08 RX ORDER — MISOPROSTOL 200 UG/1
600 TABLET ORAL ONCE AS NEEDED
Status: DISCONTINUED | OUTPATIENT
Start: 2017-09-08 | End: 2017-09-12 | Stop reason: HOSPADM

## 2017-09-08 RX ORDER — OXYTOCIN/RINGER'S LACTATE 10/500ML
125 PLASTIC BAG, INJECTION (ML) INTRAVENOUS CONTINUOUS PRN
Status: DISCONTINUED | OUTPATIENT
Start: 2017-09-08 | End: 2017-09-08 | Stop reason: HOSPADM

## 2017-09-08 RX ORDER — CLINDAMYCIN PHOSPHATE 900 MG/50ML
900 INJECTION INTRAVENOUS ONCE
Status: COMPLETED | OUTPATIENT
Start: 2017-09-08 | End: 2017-09-08

## 2017-09-08 RX ORDER — SODIUM CHLORIDE 0.9 % (FLUSH) 0.9 %
1-10 SYRINGE (ML) INJECTION AS NEEDED
Status: DISCONTINUED | OUTPATIENT
Start: 2017-09-08 | End: 2017-09-08

## 2017-09-08 RX ORDER — LIDOCAINE HYDROCHLORIDE 10 MG/ML
5 INJECTION, SOLUTION INFILTRATION; PERINEURAL AS NEEDED
Status: DISCONTINUED | OUTPATIENT
Start: 2017-09-08 | End: 2017-09-08

## 2017-09-08 RX ORDER — OXYTOCIN/RINGER'S LACTATE 10/500ML
999 PLASTIC BAG, INJECTION (ML) INTRAVENOUS ONCE
Status: DISCONTINUED | OUTPATIENT
Start: 2017-09-08 | End: 2017-09-08 | Stop reason: HOSPADM

## 2017-09-08 RX ORDER — BUPIVACAINE HYDROCHLORIDE 7.5 MG/ML
INJECTION, SOLUTION EPIDURAL; RETROBULBAR AS NEEDED
Status: DISCONTINUED | OUTPATIENT
Start: 2017-09-08 | End: 2017-09-08 | Stop reason: SURG

## 2017-09-08 RX ORDER — ONDANSETRON 2 MG/ML
4 INJECTION INTRAMUSCULAR; INTRAVENOUS ONCE AS NEEDED
Status: DISCONTINUED | OUTPATIENT
Start: 2017-09-08 | End: 2017-09-12 | Stop reason: HOSPADM

## 2017-09-08 RX ORDER — FAMOTIDINE 10 MG/ML
20 INJECTION, SOLUTION INTRAVENOUS ONCE AS NEEDED
Status: COMPLETED | OUTPATIENT
Start: 2017-09-08 | End: 2017-09-08

## 2017-09-08 RX ORDER — BISACODYL 10 MG
10 SUPPOSITORY, RECTAL RECTAL DAILY PRN
Status: DISCONTINUED | OUTPATIENT
Start: 2017-09-08 | End: 2017-09-12 | Stop reason: HOSPADM

## 2017-09-08 RX ORDER — OXYCODONE AND ACETAMINOPHEN 7.5; 325 MG/1; MG/1
2 TABLET ORAL EVERY 4 HOURS PRN
Status: DISCONTINUED | OUTPATIENT
Start: 2017-09-08 | End: 2017-09-12 | Stop reason: HOSPADM

## 2017-09-08 RX ORDER — EPHEDRINE SULFATE 50 MG/ML
INJECTION, SOLUTION INTRAVENOUS AS NEEDED
Status: DISCONTINUED | OUTPATIENT
Start: 2017-09-08 | End: 2017-09-08 | Stop reason: SURG

## 2017-09-08 RX ORDER — ONDANSETRON 2 MG/ML
4 INJECTION INTRAMUSCULAR; INTRAVENOUS ONCE AS NEEDED
Status: COMPLETED | OUTPATIENT
Start: 2017-09-08 | End: 2017-09-08

## 2017-09-08 RX ORDER — DIPHENHYDRAMINE HCL 25 MG
25 CAPSULE ORAL EVERY 4 HOURS PRN
Status: DISCONTINUED | OUTPATIENT
Start: 2017-09-08 | End: 2017-09-12 | Stop reason: HOSPADM

## 2017-09-08 RX ORDER — BISACODYL 5 MG/1
10 TABLET, DELAYED RELEASE ORAL DAILY PRN
Status: DISCONTINUED | OUTPATIENT
Start: 2017-09-08 | End: 2017-09-12 | Stop reason: HOSPADM

## 2017-09-08 RX ORDER — DIPHENHYDRAMINE HYDROCHLORIDE 50 MG/ML
25 INJECTION INTRAMUSCULAR; INTRAVENOUS EVERY 4 HOURS PRN
Status: DISCONTINUED | OUTPATIENT
Start: 2017-09-08 | End: 2017-09-12 | Stop reason: HOSPADM

## 2017-09-08 RX ORDER — MISOPROSTOL 200 UG/1
800 TABLET ORAL AS NEEDED
Status: DISCONTINUED | OUTPATIENT
Start: 2017-09-08 | End: 2017-09-08 | Stop reason: HOSPADM

## 2017-09-08 RX ORDER — METHYLERGONOVINE MALEATE 0.2 MG/ML
200 INJECTION INTRAVENOUS ONCE AS NEEDED
Status: DISCONTINUED | OUTPATIENT
Start: 2017-09-08 | End: 2017-09-08 | Stop reason: HOSPADM

## 2017-09-08 RX ORDER — ERYTHROMYCIN 5 MG/G
OINTMENT OPHTHALMIC
Status: DISPENSED
Start: 2017-09-08 | End: 2017-09-08

## 2017-09-08 RX ORDER — METHYLERGONOVINE MALEATE 0.2 MG/ML
200 INJECTION INTRAVENOUS ONCE AS NEEDED
Status: DISCONTINUED | OUTPATIENT
Start: 2017-09-08 | End: 2017-09-12 | Stop reason: HOSPADM

## 2017-09-08 RX ORDER — CARBOPROST TROMETHAMINE 250 UG/ML
250 INJECTION, SOLUTION INTRAMUSCULAR AS NEEDED
Status: DISCONTINUED | OUTPATIENT
Start: 2017-09-08 | End: 2017-09-08 | Stop reason: HOSPADM

## 2017-09-08 RX ORDER — MORPHINE SULFATE 2 MG/ML
2 INJECTION, SOLUTION INTRAMUSCULAR; INTRAVENOUS
Status: DISCONTINUED | OUTPATIENT
Start: 2017-09-08 | End: 2017-09-12 | Stop reason: HOSPADM

## 2017-09-08 RX ORDER — CARBOPROST TROMETHAMINE 250 UG/ML
250 INJECTION, SOLUTION INTRAMUSCULAR ONCE AS NEEDED
Status: DISCONTINUED | OUTPATIENT
Start: 2017-09-08 | End: 2017-09-12 | Stop reason: HOSPADM

## 2017-09-08 RX ORDER — CALCIUM CARBONATE 200(500)MG
2 TABLET,CHEWABLE ORAL EVERY 6 HOURS PRN
Status: DISCONTINUED | OUTPATIENT
Start: 2017-09-08 | End: 2017-09-12 | Stop reason: HOSPADM

## 2017-09-08 RX ORDER — ACETAMINOPHEN 325 MG/1
650 TABLET ORAL EVERY 4 HOURS PRN
Status: DISCONTINUED | OUTPATIENT
Start: 2017-09-08 | End: 2017-09-12 | Stop reason: HOSPADM

## 2017-09-08 RX ORDER — ONDANSETRON 2 MG/ML
4 INJECTION INTRAMUSCULAR; INTRAVENOUS EVERY 6 HOURS PRN
Status: DISCONTINUED | OUTPATIENT
Start: 2017-09-08 | End: 2017-09-12 | Stop reason: HOSPADM

## 2017-09-08 RX ORDER — MORPHINE SULFATE 1 MG/ML
INJECTION, SOLUTION EPIDURAL; INTRATHECAL; INTRAVENOUS AS NEEDED
Status: DISCONTINUED | OUTPATIENT
Start: 2017-09-08 | End: 2017-09-08 | Stop reason: SURG

## 2017-09-08 RX ORDER — OXYTOCIN/RINGER'S LACTATE 10/500ML
PLASTIC BAG, INJECTION (ML) INTRAVENOUS
Status: DISPENSED
Start: 2017-09-08 | End: 2017-09-08

## 2017-09-08 RX ORDER — FENTANYL CITRATE 50 UG/ML
INJECTION, SOLUTION INTRAMUSCULAR; INTRAVENOUS AS NEEDED
Status: DISCONTINUED | OUTPATIENT
Start: 2017-09-08 | End: 2017-09-08 | Stop reason: SURG

## 2017-09-08 RX ORDER — IBUPROFEN 800 MG/1
800 TABLET ORAL EVERY 8 HOURS PRN
Status: DISCONTINUED | OUTPATIENT
Start: 2017-09-08 | End: 2017-09-12 | Stop reason: HOSPADM

## 2017-09-08 RX ORDER — OXYTOCIN/RINGER'S LACTATE 10/500ML
PLASTIC BAG, INJECTION (ML) INTRAVENOUS CONTINUOUS PRN
Status: DISCONTINUED | OUTPATIENT
Start: 2017-09-08 | End: 2017-09-08 | Stop reason: SURG

## 2017-09-08 RX ORDER — ONDANSETRON 4 MG/1
4 TABLET, FILM COATED ORAL EVERY 8 HOURS PRN
Status: DISCONTINUED | OUTPATIENT
Start: 2017-09-08 | End: 2017-09-12 | Stop reason: HOSPADM

## 2017-09-08 RX ORDER — HYDROXYZINE 50 MG/1
50 TABLET, FILM COATED ORAL EVERY 6 HOURS PRN
Status: DISCONTINUED | OUTPATIENT
Start: 2017-09-08 | End: 2017-09-12 | Stop reason: HOSPADM

## 2017-09-08 RX ORDER — LANOLIN 100 %
OINTMENT (GRAM) TOPICAL
Status: DISCONTINUED | OUTPATIENT
Start: 2017-09-08 | End: 2017-09-12 | Stop reason: HOSPADM

## 2017-09-08 RX ORDER — NALOXONE HCL 0.4 MG/ML
0.4 VIAL (ML) INJECTION
Status: ACTIVE | OUTPATIENT
Start: 2017-09-08 | End: 2017-09-09

## 2017-09-08 RX ORDER — ACETAMINOPHEN 500 MG
1000 TABLET ORAL ONCE
Status: COMPLETED | OUTPATIENT
Start: 2017-09-08 | End: 2017-09-08

## 2017-09-08 RX ORDER — HYDROMORPHONE HYDROCHLORIDE 1 MG/ML
0.5 INJECTION, SOLUTION INTRAMUSCULAR; INTRAVENOUS; SUBCUTANEOUS
Status: DISCONTINUED | OUTPATIENT
Start: 2017-09-08 | End: 2017-09-12 | Stop reason: HOSPADM

## 2017-09-08 RX ORDER — SIMETHICONE 80 MG
80 TABLET,CHEWABLE ORAL 4 TIMES DAILY PRN
Status: DISCONTINUED | OUTPATIENT
Start: 2017-09-08 | End: 2017-09-12 | Stop reason: HOSPADM

## 2017-09-08 RX ORDER — SODIUM CHLORIDE, SODIUM LACTATE, POTASSIUM CHLORIDE, CALCIUM CHLORIDE 600; 310; 30; 20 MG/100ML; MG/100ML; MG/100ML; MG/100ML
125 INJECTION, SOLUTION INTRAVENOUS CONTINUOUS
Status: DISCONTINUED | OUTPATIENT
Start: 2017-09-08 | End: 2017-09-08

## 2017-09-08 RX ORDER — PHYTONADIONE 2 MG/ML
INJECTION, EMULSION INTRAMUSCULAR; INTRAVENOUS; SUBCUTANEOUS
Status: DISPENSED
Start: 2017-09-08 | End: 2017-09-08

## 2017-09-08 RX ORDER — OXYCODONE HYDROCHLORIDE AND ACETAMINOPHEN 5; 325 MG/1; MG/1
2 TABLET ORAL EVERY 4 HOURS PRN
Status: DISCONTINUED | OUTPATIENT
Start: 2017-09-08 | End: 2017-09-12 | Stop reason: HOSPADM

## 2017-09-08 RX ORDER — PROMETHAZINE HYDROCHLORIDE 25 MG/ML
INJECTION, SOLUTION INTRAMUSCULAR; INTRAVENOUS AS NEEDED
Status: DISCONTINUED | OUTPATIENT
Start: 2017-09-08 | End: 2017-09-08 | Stop reason: SURG

## 2017-09-08 RX ADMIN — GENTAMICIN SULFATE 500 MG: 40 INJECTION, SOLUTION INTRAMUSCULAR; INTRAVENOUS at 08:06

## 2017-09-08 RX ADMIN — SODIUM CHLORIDE, POTASSIUM CHLORIDE, SODIUM LACTATE AND CALCIUM CHLORIDE 125 ML/HR: 600; 310; 30; 20 INJECTION, SOLUTION INTRAVENOUS at 07:11

## 2017-09-08 RX ADMIN — PHENYLEPHRINE HYDROCHLORIDE 100 MCG: 10 INJECTION INTRAVENOUS at 09:17

## 2017-09-08 RX ADMIN — CLINDAMYCIN PHOSPHATE 900 MG: 18 INJECTION, SOLUTION INTRAMUSCULAR; INTRAVENOUS at 08:14

## 2017-09-08 RX ADMIN — PHENYLEPHRINE HYDROCHLORIDE 100 MCG: 10 INJECTION INTRAVENOUS at 09:22

## 2017-09-08 RX ADMIN — ONDANSETRON 4 MG: 2 INJECTION INTRAMUSCULAR; INTRAVENOUS at 08:03

## 2017-09-08 RX ADMIN — Medication 999 ML/HR: at 08:46

## 2017-09-08 RX ADMIN — PHENYLEPHRINE HYDROCHLORIDE 100 MCG: 10 INJECTION INTRAVENOUS at 08:45

## 2017-09-08 RX ADMIN — PHENYLEPHRINE HYDROCHLORIDE 100 MCG: 10 INJECTION INTRAVENOUS at 08:30

## 2017-09-08 RX ADMIN — FAMOTIDINE 20 MG: 10 INJECTION, SOLUTION INTRAVENOUS at 08:02

## 2017-09-08 RX ADMIN — NICOTINE 1 PATCH: 7 PATCH, EXTENDED RELEASE TRANSDERMAL at 17:10

## 2017-09-08 RX ADMIN — Medication: at 09:16

## 2017-09-08 RX ADMIN — SODIUM CHLORIDE, POTASSIUM CHLORIDE, SODIUM LACTATE AND CALCIUM CHLORIDE 1000 ML: 600; 310; 30; 20 INJECTION, SOLUTION INTRAVENOUS at 06:15

## 2017-09-08 RX ADMIN — DIPHENHYDRAMINE HYDROCHLORIDE 25 MG: 50 INJECTION, SOLUTION INTRAMUSCULAR; INTRAVENOUS at 10:37

## 2017-09-08 RX ADMIN — FENTANYL CITRATE 10 MCG: 50 INJECTION, SOLUTION INTRAMUSCULAR; INTRAVENOUS at 08:25

## 2017-09-08 RX ADMIN — BUPIVACAINE HYDROCHLORIDE 1.9 ML: 7.5 INJECTION, SOLUTION EPIDURAL; RETROBULBAR at 08:25

## 2017-09-08 RX ADMIN — PROMETHAZINE HYDROCHLORIDE 6.25 MG: 25 INJECTION INTRAMUSCULAR; INTRAVENOUS at 09:07

## 2017-09-08 RX ADMIN — EPHEDRINE SULFATE 10 MG: 50 INJECTION INTRAMUSCULAR; INTRAVENOUS; SUBCUTANEOUS at 08:33

## 2017-09-08 RX ADMIN — PHENYLEPHRINE HYDROCHLORIDE 100 MCG: 10 INJECTION INTRAVENOUS at 09:09

## 2017-09-08 RX ADMIN — MORPHINE SULFATE 0.3 MG: 1 INJECTION EPIDURAL; INTRATHECAL; INTRAVENOUS at 08:25

## 2017-09-08 RX ADMIN — PHENYLEPHRINE HYDROCHLORIDE 200 MCG: 10 INJECTION INTRAVENOUS at 08:54

## 2017-09-08 RX ADMIN — PHENYLEPHRINE HYDROCHLORIDE 100 MCG: 10 INJECTION INTRAVENOUS at 08:51

## 2017-09-08 RX ADMIN — DIPHENHYDRAMINE HYDROCHLORIDE 25 MG: 25 CAPSULE ORAL at 21:49

## 2017-09-08 RX ADMIN — ACETAMINOPHEN 1000 MG: 500 TABLET ORAL at 08:02

## 2017-09-08 NOTE — ANESTHESIA PREPROCEDURE EVALUATION
Anesthesia Evaluation     Patient summary reviewed and Nursing notes reviewed   NPO Solid Status: > 8 hours  NPO Liquid Status: > 2 hours     Airway   Mallampati: II  no difficulty expected  Dental - normal exam     Pulmonary - normal exam   (+) a smoker Current,   Cardiovascular - normal exam    (+) hypertension,       Neuro/Psych  GI/Hepatic/Renal/Endo    (+) obesity, morbid obesity,     Musculoskeletal     Abdominal   (+) obese,    Substance History      OB/GYN    (+) Pregnant, pregnancy induced hypertension        Other                                        Anesthesia Plan    ASA 3     spinal   (Duramorph  Risks and benefits discussed icluding bleeding, infection, nerve damage, Spinal headache)  Anesthetic plan and risks discussed with patient and other.

## 2017-09-08 NOTE — PLAN OF CARE
Problem: Patient Care Overview (Adult)  Goal: Plan of Care Review  Outcome: Ongoing (interventions implemented as appropriate)    17 0851   Coping/Psychosocial Response Interventions   Plan Of Care Reviewed With patient   Patient Care Overview   Progress improving   Outcome Evaluation   Outcome Summary/Follow up Plan Pt continuing to progress toward a normal c section recovery       Goal: Adult Individualization and Mutuality  Outcome: Ongoing (interventions implemented as appropriate)  Goal: Discharge Needs Assessment  Outcome: Outcome(s) achieved Date Met:  17    Problem:  Delivery (Adult,Obstetrics,Pediatric)  Goal: Signs and Symptoms of Listed Potential Problems Will be Absent or Manageable ( Delivery)  Outcome: Outcome(s) achieved Date Met:  17    Problem: Fall Risk  (Adult,Obstetrics,Pediatric)  Goal: Identify Related Risk Factors and Signs and Symptoms  Outcome: Ongoing (interventions implemented as appropriate)  Goal: Absence of Maternal Fall  Outcome: Ongoing (interventions implemented as appropriate)  Goal: Absence of [place] Fall/Drop  Outcome: Ongoing (interventions implemented as appropriate)    Problem: Anesthesia/Analgesia, Neuraxial (Adult)  Goal: Signs and Symptoms of Listed Potential Problems Will be Absent or Manageable (Anesthesia/Analgesia, Neuraxial)  Outcome: Ongoing (interventions implemented as appropriate)

## 2017-09-08 NOTE — H&P
[facility]  History & Physical    2017    Patient: Debra Moreno          MR#:[id number]    Chief complaint:  RCS    Subjective     Patient is a 26 y.o. female  at 39w0d presents for scheduled repeat  section.  Patient had previously plan sterilization but changed her mind.  Patient presents feeling well today with no complaints.   testing and serial growth have been normal/reassuring      Prenatal care complicated by items listed in the problem list below    Patient Active Problem List   Diagnosis   • Supervision of normal pregnancy   • Tobacco smoking affecting pregnancy   • History of  delivery   • Obesity affecting pregnancy   • Request for sterilization   • Gestational hypertension w/o significant proteinuria in 3rd trimester   • Pregnancy       Past Medical History:   Diagnosis Date   • Anemia    • Bacterial vaginal infection 10/2015   • GC (gonococcus infection)    • Hypertension 2012    GESTATIONAL HYPERTENSION   • Obesity        Past Surgical History:   Procedure Laterality Date   •  SECTION         Obstetric History:  OB History      Para Term  AB TAB SAB Ectopic Multiple Living    2 1 1       1        Obstetric Comments    2017 10w3d, dating ultrasound EDC 9/15/17 by LMP consistent with scan hb   2017 19w0d normal and completed anatomic survey, normal interval growth, male fetus hb   7.27.17 - 32-6 weeks EFW 57%, AC 41% - JHF   2017 37w0d normal interval jairo wth: EFW 41% AC 17%, BPP 8/8  hb   2017 38w0d reassuring BPP 8/8, BIN:12.7 hb              Menstrual History:     Patient's last menstrual period was 2016 (exact date).       #: 1, Date: 12, Sex: Male, Weight: 7 lb 5 oz (3.317 kg), GA: 40w0d, Delivery: , Low Transverse, Apgar1: 8, Apgar5: 9, Living: Yes, Birth Comments: None    #: 2, Date: None, Sex: None, Weight: None, GA: None, Delivery: None, Apgar1: None, Apgar5:  None, Living: None, Birth Comments: None      Family History   Problem Relation Age of Onset   • Hypertension Maternal Grandmother    • Hypertension Maternal Grandfather    • Diabetes Other    • Diabetes Paternal Grandfather        Social History   Substance Use Topics   • Smoking status: Current Every Day Smoker     Packs/day: 0.50     Types: Cigarettes   • Smokeless tobacco: Never Used   • Alcohol use No       Review of patient's allergies indicates no known allergies.      Current Facility-Administered Medications:   •  acetaminophen (TYLENOL) tablet 1,000 mg, 1,000 mg, Oral, Once, Lyn Lorenzo MD  •  clindamycin (CLEOCIN) 900 mg in dextrose 5% 50 mL IVPB (premix), 900 mg, Intravenous, Once **AND** gentamicin (GARAMYCIN) 500 mg in sodium chloride 0.9 % 100 mL IVPB, 5 mg/kg (Adjusted), Intravenous, Once, Angel Barrios MD  •  famotidine (PEPCID) injection 20 mg, 20 mg, Intravenous, Once PRN, Lyn Lorenzo MD  •  lactated ringers infusion, 125 mL/hr, Intravenous, Continuous, Angel Barrios MD, Last Rate: 125 mL/hr at 09/08/17 0711, 125 mL/hr at 09/08/17 0711  •  lidocaine (XYLOCAINE) 1 % injection 5 mL, 5 mL, Intradermal, PRN, Angel Barrios MD  •  ondansetron (ZOFRAN) injection 4 mg, 4 mg, Intravenous, Once PRN, Lyn Lorenzo MD  •  sodium chloride 0.9 % flush 1-10 mL, 1-10 mL, Intravenous, PRN, Angel Barrios MD    Review of Systems  Review of Systems   Constitutional: Negative.    Respiratory: Negative.    Cardiovascular: Negative.    Gastrointestinal: Negative.    Genitourinary: Negative.         Few contractions, active fetus      Psychiatric/Behavioral: Negative.        Objective     Vital Signs  Temp:  [98.4 °F (36.9 °C)] 98.4 °F (36.9 °C)  Heart Rate:  [83-93] 83  Resp:  [16] 16  BP: (137-142)/(50-67) 137/50    Physical Exam:  Physical Exam   Constitutional: She is oriented to person, place, and time. She appears well-developed and well-nourished.   HENT:   Head: Normocephalic and atraumatic.    Cardiovascular: Normal rate and regular rhythm.    Pulmonary/Chest: Effort normal and breath sounds normal.   Abdominal: Soft. Bowel sounds are normal. She exhibits no distension and no mass. There is no tenderness.   Gravid and morbidly obese     Genitourinary: Vagina normal and uterus normal.   Genitourinary Comments:     Neurological: She is alert and oriented to person, place, and time. She has normal reflexes.   Skin: Skin is warm and dry.   Psychiatric: She has a normal mood and affect. Her behavior is normal. Judgment and thought content normal.   Nursing note and vitals reviewed.      Labs:  Lab Results (last 24 hours)     Procedure Component Value Units Date/Time    Comprehensive Metabolic Panel [[id number]]  (Abnormal) Collected:  17    Specimen:  Blood from Arm, Left Updated:  17     Glucose 95 mg/dL      BUN 9 mg/dL      Creatinine 0.49 (L) mg/dL      Sodium 137 mmol/L      Potassium 3.9 mmol/L      Chloride 101 mmol/L      CO2 20.8 (L) mmol/L      Calcium 9.2 mg/dL      Total Protein 6.8 g/dL      Albumin 3.20 (L) g/dL      ALT (SGPT) 10 U/L      AST (SGOT) 8 U/L      Alkaline Phosphatase 142 (H) U/L      Total Bilirubin 0.2 mg/dL      eGFR Non African Amer >150 mL/min/1.73      Globulin 3.6 gm/dL      A/G Ratio 0.9 g/dL      BUN/Creatinine Ratio 18.4     Anion Gap 15.2 mmol/L     CBC (No Diff) [[id number]]  (Abnormal) Collected:  17 06    Specimen:  Blood from Arm, Right Updated:  17 0643     WBC 15.37 (H) 10*3/mm3      RBC 3.89 (L) 10*6/mm3      Hemoglobin 11.3 (L) g/dL      Hematocrit 34.6 (L) %      MCV 88.9 fL      MCH 29.0 pg      MCHC 32.7 g/dL      RDW 13.8 (H) %      RDW-SD 44.8 fl      MPV 10.2 fL      Platelets 478 10*3/mm3             Assessment/Plan     1.  Intrauterine Pregnancy at 39w0d  2.  Prior  for repeat  section  3.  Morbid class II obesity affecting pregnancy  4.  Gestational hypertension with signs of preeclampsia   5.  Tobacco  use affecting pregnancy        Active Problems:    Pregnancy      Reviewed the surgical procedure with patient.  I discussed the risks including but not limited to bleeding, infection and damage to internal organs.  Understanding of the procedure is voiced.     Angel Barrios MD  09/08/17  7:51 AM      Patient Care Team:  Nazario Weinstein II, MD as PCP - General

## 2017-09-08 NOTE — OP NOTE
[facility]     2017    Patient:Debra Moreno   MR#:[id number]     Section Procedure Note    Indications: previous uterine incision transverse uterine, vertical skin    Pre-operative Diagnosis: Intrauterine pregnancy at 39w0d    Post-operative Diagnosis: same    Procedure:  Repeat low transverse  section     Surgeon: Angel Barrios MD     Assistants: Anila Dupont MD    Anesthesia: Spinal anesthesia    Prenatal care problem list:    Patient Active Problem List   Diagnosis   • Supervision of normal pregnancy   • Tobacco smoking affecting pregnancy   • History of  delivery   • Obesity affecting pregnancy   • Request for sterilization   • Gestational hypertension w/o significant proteinuria in 3rd trimester   • Pregnancy       Procedure Details   The patient was seen in the LDR preoperatively. The risks, benefits, complications, treatment options, and expected outcomes were discussed with the patient.  The patient concurred with the proposed plan, giving informed consent.  The site of surgery is discussed. The patient was taken to Operating Room # 02, identified as Debra Moreno and the procedure verified as  Delivery. A Time Out was held and the above information confirmed.    After induction of anesthesia, the patient was draped and prepped in the usual sterile manner. A Pfannenstiel incision was made and carried down through the subcutaneous tissue to the fascia. Fascial incision was made and extended transversely. The fascia was  from the underlying rectus tissue superiorly and inferiorly. The peritoneum was identified and entered. Peritoneal incision was extended longitudinally. The utero-vesical peritoneal reflection was incised transversely and the bladder flap was bluntly freed from the lower uterine segment. A low transverse uterine incision was made. Delivered from vertex presentation was a male  fetus 6 lb 6.8 oz  (2.915 kg)  with Apgar scores of 8    at one minute and 9   at five minutes. After the umbilical cord was clamped and cut cord blood was obtained for evaluation. The placenta was removed intact and appeared normal. The uterine outline, tubes and ovaries appeared normal.  The uterine incision was closed with running locked sutures of 0 Vicryl. Hemostasis was observed. Lavage was carried out until clear.     Peritoneum was closed with 2-0 Vicryl in a running fashion incorporating the muscle in the closure    The fascia was then reapproximated with running sutures of 0 Vicryl. The deep subcutaneous layer was reapproximated with 3-0 Vicryl in a running fashion. The skin was reapproximated with staples.     Instrument, sponge, and needle counts were correct prior the abdominal closure and at the conclusion of the case.     Findings:  Morbid obesity, unremarkable anatomy     Estimated Blood Loss:  600 cc           Total IV Fluids: 1500 ml           Specimens:  Placenta            Complications:  None; patient tolerated the procedure well.           Disposition: PACU - hemodynamically stable.           Condition: stable    Attending Attestation: I was present and scrubbed for the entire procedure.      [x]  Labs reviewed:   Blood type O pos, Rubella Imm, Varicella Imm, Tdap given         )Angel Barrios MD  2017 5:17 PM

## 2017-09-08 NOTE — ANESTHESIA POSTPROCEDURE EVALUATION
"Patient: Debra Moreno    Procedure Summary     Date Anesthesia Start Anesthesia Stop Room / Location    17 0819 0935  [facility] /  [facility]       Procedure Diagnosis Surgeon Provider     SECTION REPEAT (N/A Abdomen) History of  delivery  (History of  delivery [Z98.891]) MD Lyn Aguilar MD          Anesthesia Type: spinal  Last vitals  BP   108/58 (17 1041)    Temp        Pulse   86 (17 1041)   Resp   16 (17 0957)    SpO2   98 % (17 1041)      Post Anesthesia Care and Evaluation    Patient location during evaluation: bedside  Patient participation: complete - patient participated  Level of consciousness: awake and alert  Pain management: adequate  Airway patency: patent  Anesthetic complications: No anesthetic complications    Cardiovascular status: acceptable  Respiratory status: acceptable  Hydration status: acceptable    Comments: /58  Pulse 86  Temp 36.6 °C (97.9 °F) (Oral)   Resp 16  Ht 68\" (172.7 cm)  Wt (!) 332 lb 6.4 oz (151 kg)  LMP 2016 (Exact Date)  SpO2 98%  Breastfeeding? Unknown  BMI 50.54 kg/m2      "

## 2017-09-08 NOTE — ANESTHESIA PROCEDURE NOTES
Spinal Block    Patient location during procedure: OB  Indication:at surgeon's request, post-op pain management and procedure for pain  Performed By  Anesthesiologist: HERI CRENSHAW  CRNA: SANTIAGO ORDAZ  Preanesthetic Checklist  Completed: patient identified, site marked, surgical consent, pre-op evaluation, timeout performed, IV checked, risks and benefits discussed and monitors and equipment checked  Spinal Block Prep:  Patient Position:sitting  Sterile Tech:cap, gloves, mask and sterile barriers  Prep:Chloraprep  Patient Monitoring:continuous pulse oximetry and blood pressure monitoring  Spinal Block Procedure  Approach:midline  Guidance:landmark technique and palpation technique  Location:L5-S1  Needle Type:Sprotte  Needle Gauge:24 G  Placement of Spinal needle event:cerebrospinal fluid aspirated  Paresthesia: no  Fluid Appearance:clear  Post Assessment  Patient Tolerance:patient tolerated the procedure well with no apparent complications  Complications no  Additional Notes  Pt sitting, chloroprep used, sterile technique (gloves/mask/drape). L5-S1, 24G Sprotte used with spinal tray. Negative paresthesia. Positive CSF aspirated, free flowing. Negative blood aspirated.

## 2017-09-09 LAB
BASOPHILS # BLD AUTO: 0.04 10*3/MM3 (ref 0–0.2)
BASOPHILS NFR BLD AUTO: 0.3 % (ref 0–1.5)
DEPRECATED RDW RBC AUTO: 46.1 FL (ref 37–54)
EOSINOPHIL # BLD AUTO: 0.24 10*3/MM3 (ref 0–0.7)
EOSINOPHIL NFR BLD AUTO: 1.7 % (ref 0.3–6.2)
ERYTHROCYTE [DISTWIDTH] IN BLOOD BY AUTOMATED COUNT: 14 % (ref 11.7–13)
HCT VFR BLD AUTO: 31.6 % (ref 35.6–45.5)
HGB BLD-MCNC: 10.3 G/DL (ref 11.9–15.5)
IMM GRANULOCYTES # BLD: 0.04 10*3/MM3 (ref 0–0.03)
IMM GRANULOCYTES NFR BLD: 0.3 % (ref 0–0.5)
LYMPHOCYTES # BLD AUTO: 3.16 10*3/MM3 (ref 0.9–4.8)
LYMPHOCYTES NFR BLD AUTO: 22.1 % (ref 19.6–45.3)
MCH RBC QN AUTO: 29.4 PG (ref 26.9–32)
MCHC RBC AUTO-ENTMCNC: 32.6 G/DL (ref 32.4–36.3)
MCV RBC AUTO: 90.3 FL (ref 80.5–98.2)
MONOCYTES # BLD AUTO: 1.12 10*3/MM3 (ref 0.2–1.2)
MONOCYTES NFR BLD AUTO: 7.8 % (ref 5–12)
NEUTROPHILS # BLD AUTO: 9.73 10*3/MM3 (ref 1.9–8.1)
NEUTROPHILS NFR BLD AUTO: 67.8 % (ref 42.7–76)
PLATELET # BLD AUTO: 441 10*3/MM3 (ref 140–500)
PMV BLD AUTO: 10.1 FL (ref 6–12)
RBC # BLD AUTO: 3.5 10*6/MM3 (ref 3.9–5.2)
WBC NRBC COR # BLD: 14.33 10*3/MM3 (ref 4.5–10.7)

## 2017-09-09 PROCEDURE — 99232 SBSQ HOSP IP/OBS MODERATE 35: CPT | Performed by: OBSTETRICS & GYNECOLOGY

## 2017-09-09 PROCEDURE — 85025 COMPLETE CBC W/AUTO DIFF WBC: CPT | Performed by: OBSTETRICS & GYNECOLOGY

## 2017-09-09 PROCEDURE — 94799 UNLISTED PULMONARY SVC/PX: CPT

## 2017-09-09 RX ADMIN — IBUPROFEN 800 MG: 800 TABLET ORAL at 06:35

## 2017-09-09 RX ADMIN — Medication: at 23:31

## 2017-09-09 RX ADMIN — ACETAMINOPHEN 650 MG: 325 TABLET ORAL at 12:26

## 2017-09-09 RX ADMIN — SIMETHICONE CHEW TAB 80 MG 80 MG: 80 TABLET ORAL at 12:30

## 2017-09-09 RX ADMIN — IBUPROFEN 800 MG: 800 TABLET ORAL at 17:45

## 2017-09-09 RX ADMIN — ACETAMINOPHEN 650 MG: 325 TABLET ORAL at 23:34

## 2017-09-09 NOTE — PROGRESS NOTES
[facility]     Section PROGRESS NOTE                                                  2017    CC: Post-Op Day 1 S/P    Subjective     Patient reports:  Pain is well controlled with Motrin only once, no Percocet.  She is  ambulating.    Voiding - without difficulty.  Tolerating  Regular diet.  Passing flatus.   Vaginal bleeding is as much as expected.                Breast Feeding:    Objective                Vitals: Vital Signs Range for the last 24 hours  Temperature: Temp:  [97.5 °F (36.4 °C)-98 °F (36.7 °C)] 97.9 °F (36.6 °C)   Temp Source: Temp src: Oral   BP: BP: ()/(53-80) 90/53   Pulse: Heart Rate:  [80-96] 82   Respirations: Resp:  [16-20] 16   SPO2: SpO2:  [93 %-99 %] 94 %   O2 Amount (l/min):     O2 Devices O2 Device: room air   Weight:              Physical Exam  Gen'l Appearance   Awake, Alert, In no distress.   Lungs  Back  Heart Clear to auscultation bilaterally.    RR without murmur/gallop.   Abdomen Obese, Soft, Bowel sounds present.   Incision  no drainage, no erythema, no swelling, well approximated, some erythema from her tape.   Extremities Calves not tender ; 1+ edema.                   Labs:     Results from last 7 days  Lab Units 17  0827 17  0620   WBC 10*3/mm3 14.33* 15.37*   HEMOGLOBIN g/dL 10.3* 11.3*   HEMATOCRIT % 31.6* 34.6*   PLATELETS 10*3/mm3 441 478     Blood Type: O Positive.     Rubella: Immune    Assessment/Plan     Assessment:    Debra ALDANA Moreno is Day 1  Post Op   .                1. Morbid obesity but ambulating well, going outside to smoke.                  Plan:  continue post op care.  Patient requested circumcision. Discussed risks and benefits, including:     Lack of medical necessity,      And Risks of Infection requiring antibiotics,                           Bleeding requiring suturing,                          Injury requiring repair,                           Potential need for it to be  repeated later.     Having said all this, the patient wants to proceed with the circumcision.    Galdino De Anda MD  9/9/2017  3:17 PM

## 2017-09-09 NOTE — LACTATION NOTE
This note was copied from a baby's chart.  P2 39 wks, Mom reports BF her first for 1-2 wks. Current baby latches deeply but needs frequent stimulation to keep going. She has hand pump in room and instructions given to insurance pump and/or pump after nursing until he is nursing better. She is also supplementing with formula. Will call for further assist.

## 2017-09-10 PROCEDURE — 99232 SBSQ HOSP IP/OBS MODERATE 35: CPT | Performed by: OBSTETRICS & GYNECOLOGY

## 2017-09-10 RX ADMIN — IBUPROFEN 800 MG: 800 TABLET ORAL at 03:03

## 2017-09-10 RX ADMIN — IBUPROFEN 800 MG: 800 TABLET ORAL at 11:06

## 2017-09-10 RX ADMIN — ACETAMINOPHEN 650 MG: 325 TABLET ORAL at 18:52

## 2017-09-10 RX ADMIN — SIMETHICONE CHEW TAB 80 MG 80 MG: 80 TABLET ORAL at 09:53

## 2017-09-10 NOTE — PROGRESS NOTES
" POSTPARTUM ROUNDS    Chief complaint: post C/S concerns  SUBJECTIVE:  Patient appears comfortable,pain and seems to be controlled with by mouth medicines.  She is ambulating. .  Discussed advancing activity and diet.       OBJECTIVE:  Vital Signs: /64  Pulse 85  Temp 98.4 °F (36.9 °C)  Resp 18  Ht 68\" (172.7 cm)  Wt (!) 332 lb 6.4 oz (151 kg)  LMP 2016 (Exact Date)  SpO2 94%  Breastfeeding? Unknown  BMI 50.54 kg/m2    Intake/Output Summary (Last 24 hours) at 09/10/17 0857  Last data filed at 17 2305   Gross per 24 hour   Intake             4430 ml   Output             1000 ml   Net             3430 ml       Constitutional: The patient is well nourished.  Cardiovascular:RRR  Resp: nonlabored breathing  The incision is normal  Gastrointestinal: fundus firm below umbilicus  Extremities:no edema,no evidence dvt    LABS / IMAGING:  Lab Results (last 24 hours)     Procedure Component Value Units Date/Time    CBC & Differential [[id number]] Collected:  17    Specimen:  Blood Updated:  17    Narrative:       The following orders were created for panel order CBC & Differential.  Procedure                               Abnormality         Status                     ---------                               -----------         ------                     Scan Slide[[id number]]                                                                  CBC Auto Differential[[id number]]        Abnormal            Final result                 Please view results for these tests on the individual orders.    CBC Auto Differential [[id number]]  (Abnormal) Collected:  17    Specimen:  Blood Updated:  17     WBC 14.33 (H) 10*3/mm3      RBC 3.50 (L) 10*6/mm3      Hemoglobin 10.3 (L) g/dL      Hematocrit 31.6 (L) %      MCV 90.3 fL      MCH 29.4 pg      MCHC 32.6 g/dL      RDW 14.0 (H) %      RDW-SD 46.1 fl      MPV 10.1 fL      Platelets 441 10*3/mm3      Neutrophil % 67.8 % "      Lymphocyte % 22.1 %      Monocyte % 7.8 %      Eosinophil % 1.7 %      Basophil % 0.3 %      Immature Grans % 0.3 %      Neutrophils, Absolute 9.73 (H) 10*3/mm3      Lymphocytes, Absolute 3.16 10*3/mm3      Monocytes, Absolute 1.12 10*3/mm3      Eosinophils, Absolute 0.24 10*3/mm3      Basophils, Absolute 0.04 10*3/mm3      Immature Grans, Absolute 0.04 (H) 10*3/mm3           ASSESSMENT AND PLAN:    Active Problems:    Pregnancy  Overview:  Resolved Problems:    * No resolved hospital problems. *      Patient is postop day 2 from LTCS  Patient is doing well  Advance diet as tolerated      Regular diet   Encourage ambulation     Galdino Arce MD    9/10/2017  8:57 AM

## 2017-09-11 RX ADMIN — ACETAMINOPHEN 650 MG: 325 TABLET ORAL at 15:17

## 2017-09-11 RX ADMIN — IBUPROFEN 800 MG: 800 TABLET ORAL at 04:31

## 2017-09-11 RX ADMIN — ACETAMINOPHEN 650 MG: 325 TABLET ORAL at 04:31

## 2017-09-11 RX ADMIN — ACETAMINOPHEN 650 MG: 325 TABLET ORAL at 20:24

## 2017-09-11 RX ADMIN — IBUPROFEN 800 MG: 800 TABLET ORAL at 20:24

## 2017-09-11 NOTE — PLAN OF CARE
Problem: Patient Care Overview (Adult)  Goal: Plan of Care Review  Outcome: Ongoing (interventions implemented as appropriate)    09/10/17 2241   Coping/Psychosocial Response Interventions   Plan Of Care Reviewed With patient   Patient Care Overview   Progress improving   Outcome Evaluation   Outcome Summary/Follow up Plan Doing well, pain well controlled w po meds, monitoring CS incision for infection/cellulitis, walks in halls frequently        Goal: Adult Individualization and Mutuality  Outcome: Ongoing (interventions implemented as appropriate)  Goal: Discharge Needs Assessment  Outcome: Ongoing (interventions implemented as appropriate)    Problem: Fall Risk  (Adult,Obstetrics,Pediatric)  Goal: Identify Related Risk Factors and Signs and Symptoms  Outcome: Ongoing (interventions implemented as appropriate)  Goal: Absence of Maternal Fall  Outcome: Ongoing (interventions implemented as appropriate)  Goal: Absence of  Fall/Drop  Outcome: Ongoing (interventions implemented as appropriate)    Problem: Anesthesia/Analgesia, Neuraxial (Adult)  Goal: Signs and Symptoms of Listed Potential Problems Will be Absent or Manageable (Anesthesia/Analgesia, Neuraxial)  Outcome: Outcome(s) achieved Date Met:  09/10/17    Problem: Postpartum ( Delivery) (Adult)  Goal: Signs and Symptoms of Listed Potential Problems Will be Absent or Manageable (Postpartum)  Outcome: Ongoing (interventions implemented as appropriate)

## 2017-09-11 NOTE — PLAN OF CARE
Problem: Patient Care Overview (Adult)  Goal: Plan of Care Review  Outcome: Ongoing (interventions implemented as appropriate)    17 5875   Coping/Psychosocial Response Interventions   Plan Of Care Reviewed With patient   Patient Care Overview   Progress improving   Outcome Evaluation   Outcome Summary/Follow up Plan ambulates in the amor, pain under control       Goal: Adult Individualization and Mutuality  Outcome: Ongoing (interventions implemented as appropriate)  Goal: Discharge Needs Assessment  Outcome: Ongoing (interventions implemented as appropriate)    Problem: Postpartum ( Delivery) (Adult)  Goal: Signs and Symptoms of Listed Potential Problems Will be Absent or Manageable (Postpartum)  Outcome: Ongoing (interventions implemented as appropriate)    Problem: Breastfeeding (Adult,NICU,,Obstetrics,Pediatric)  Goal: Signs and Symptoms of Listed Potential Problems Will be Absent or Manageable (Breastfeeding)  Outcome: Ongoing (interventions implemented as appropriate)

## 2017-09-11 NOTE — PROGRESS NOTES
[facility]     2017    Name:Debra Moreno    MR#:[id number]     PROGRESS NOTE:  Post-Op 3 S/P    HD:3    Subjective   26 y.o. yo Female  s/p CS at 39w0d doing well. Pain well controlled. Tolerating regular diet and having flatus. Lochia normal.     Patient Active Problem List   Diagnosis   • Supervision of normal pregnancy   • Tobacco smoking affecting pregnancy   • History of  delivery   • Obesity affecting pregnancy   • Request for sterilization   • Gestational hypertension w/o significant proteinuria in 3rd trimester   • Pregnancy        Objective    Vitals  Temp:  Temp:  [98.2 °F (36.8 °C)-98.5 °F (36.9 °C)] 98.2 °F (36.8 °C)  Temp src: Oral  BP:  BP: (116-146)/(71-80) 133/72  Pulse:  Heart Rate:  [] 83  RR:   Resp:  [18-20] 18  Weight: (!) 332 lb 6.4 oz (151 kg)  BMI:  Body mass index is 50.54 kg/(m^2).      General Awake, alert, no distress  Abdomen Soft, non-distended, fundus firm, below umbilicus, appropriately tender  Incision  Intact, no erythema or exudate with staples   Extremities Calves NT bilaterally         I/O last 3 completed shifts:  In: 720 [P.O.:720]  Out: -     LABS:   Lab Results   Component Value Date    WBC 14.33 (H) 2017    HGB 10.3 (L) 2017    HCT 31.6 (L) 2017    MCV 90.3 2017     2017       Infant: male       Assessment   1.  POD 3  2. Male - s/p circ   3. Obesity - staples removed Thursday in [place] office     Plan: Doing well.  Routine postoperative care      Active Problems:   None      Rosario Kearns MD  2017 10:41 AM

## 2017-09-11 NOTE — LACTATION NOTE
Mom reports baby is BF some and she gives formula. Mom denies questions or needing assistance at this time. Encouraged to call if needed.

## 2017-09-12 VITALS
HEART RATE: 86 BPM | TEMPERATURE: 98.1 F | WEIGHT: 293 LBS | RESPIRATION RATE: 18 BRPM | OXYGEN SATURATION: 94 % | BODY MASS INDEX: 44.41 KG/M2 | DIASTOLIC BLOOD PRESSURE: 66 MMHG | HEIGHT: 68 IN | SYSTOLIC BLOOD PRESSURE: 119 MMHG

## 2017-09-12 PROCEDURE — 99238 HOSP IP/OBS DSCHRG MGMT 30/<: CPT | Performed by: OBSTETRICS & GYNECOLOGY

## 2017-09-12 RX ORDER — IBUPROFEN 800 MG/1
800 TABLET ORAL EVERY 8 HOURS PRN
Qty: 30 TABLET | Refills: 0 | Status: SHIPPED | OUTPATIENT
Start: 2017-09-12 | End: 2019-08-28

## 2017-09-12 RX ORDER — OXYCODONE HYDROCHLORIDE AND ACETAMINOPHEN 5; 325 MG/1; MG/1
2 TABLET ORAL EVERY 6 HOURS PRN
Qty: 30 TABLET | Refills: 0 | Status: SHIPPED | OUTPATIENT
Start: 2017-09-12 | End: 2017-09-18

## 2017-09-12 RX ADMIN — IBUPROFEN 800 MG: 800 TABLET ORAL at 11:21

## 2017-09-12 RX ADMIN — ACETAMINOPHEN 650 MG: 325 TABLET ORAL at 01:22

## 2017-09-12 NOTE — PLAN OF CARE
Problem: Postpartum ( Delivery) (Adult)  Goal: Signs and Symptoms of Listed Potential Problems Will be Absent or Manageable (Postpartum)  Outcome: Ongoing (interventions implemented as appropriate)    17 1001   Postpartum ( Delivery)   Problems Assessed (Postpartum ) all   Problems Present (Postpartum ) none         Problem: Breastfeeding (Adult,NICU,[place],Obstetrics,Pediatric)  Goal: Signs and Symptoms of Listed Potential Problems Will be Absent or Manageable (Breastfeeding)  Outcome: Ongoing (interventions implemented as appropriate)    17 1001   Breastfeeding   Problems Assessed (Breastfeeding) all   Problems Present (Breastfeeding) none

## 2017-09-12 NOTE — LACTATION NOTE
Pt thrilled milk is in.  Has been pumping and bottle feeding EBM and latching some.  Wt loss and output wnl.  Encouraged follow up in clinic.  Reviewed diet, engorgement management, feeding frequency, and storage of EBM.

## 2017-09-12 NOTE — PROGRESS NOTES
" POSTPARTUM ROUNDS    SUBJECTIVE:    CC:  POD 4 from CS    Subjective:  Pt is doing well, pain is well controlled, pt is ambulating and tolerating a regular diet.  Voiding well.    Review of Systems - Negative except appropriate cramping  NO FEVER OR CHILLS , NO NAUSEA OR VOMITING, NO LEG PAIN    OBJECTIVE:  Vital Signs: /66 (BP Location: Right arm, Patient Position: Lying)  Pulse 86  Temp 98.1 °F (36.7 °C) (Oral)   Resp 18  Ht 68\" (172.7 cm)  Wt (!) 332 lb 6.4 oz (151 kg)  LMP 2016 (Exact Date)  SpO2 94%  Breastfeeding? Unknown  BMI 50.54 kg/m2  No intake or output data in the 24 hours ending 17 1029    Constitutional: The patient is well nourished. Alert and oriented.  Mental status is upbeat, no signs postpartum depression.   Cardiovascular:RRR  Resp: nonlabored breathing  The incision is  clean, dry and Intact , staples intact  Gastrointestinal: fundus firm below umbilicus  Extremities:no edema, non-tender bilateral    LABS / IMAGING:  Lab Results (last 24 hours)     ** No results found for the last 24 hours. **          ASSESSMENT AND PLAN:    POD 3 from  delivery:    Patient Active Problem List   Diagnosis   • Supervision of normal pregnancy   • Tobacco smoking affecting pregnancy   • History of  delivery   • Obesity affecting pregnancy   • Request for sterilization   • Gestational hypertension w/o significant proteinuria in 3rd trimester   • Pregnancy       Patient is postop day 4 from LTCS  Patient is doing well   Encourage ambulation   Discharge, RTO in 2 days for staple removal    Balbina Khan MD    2017  10:29 AM      "

## 2017-09-12 NOTE — DISCHARGE SUMMARY
[facility]  Delivery Discharge Summary    Primary OB Clinician: Dr Kearns    EDC: Estimated Date of Delivery: 9/15/17    Gestational Age:39w0d    Antepartum complications: none    Date of Delivery: 2017   Time of Delivery: 8:45 AM     Delivered By:  Angel Barrios     Delivery Type: , Low Transverse      Tubal Ligation: n/a    Baby:male infant;   Apgar:  8   @ 1 minute /   Apgar:  9   @ 5 minutes        Anesthesia: Spinal      Intrapartum complications: None    Laceration: No    Episiotomy: No    Placenta: Manual removal     Feeding method: Breastfeeding Status: Unknown    Rh Immune globulin given: no    Rubella vaccine given: no    Discharge Date: 2017; Discharge Time: 10:33 AM    Early Discharge:  NO  Hospital Course:  The patient was admitted following delivery.  She did well postpartum with normal return of bowel function and remained afebrile.    Blood type:  Rubella immune  dTap given prenatally        Plan:    Follow-up appointment with Dr Kearns or Denis in 2 days.

## 2017-09-12 NOTE — PLAN OF CARE
Problem: Patient Care Overview (Adult)  Goal: Plan of Care Review  Outcome: Ongoing (interventions implemented as appropriate)  Goal: Adult Individualization and Mutuality  Outcome: Ongoing (interventions implemented as appropriate)  Goal: Discharge Needs Assessment  Outcome: Ongoing (interventions implemented as appropriate)    Problem: Fall Risk  (Adult,Obstetrics,Pediatric)  Goal: Identify Related Risk Factors and Signs and Symptoms  Outcome: Ongoing (interventions implemented as appropriate)  Goal: Absence of Maternal Fall  Outcome: Ongoing (interventions implemented as appropriate)  Goal: Absence of  Fall/Drop  Outcome: Ongoing (interventions implemented as appropriate)    Problem: Postpartum ( Delivery) (Adult)  Goal: Signs and Symptoms of Listed Potential Problems Will be Absent or Manageable (Postpartum)  Outcome: Ongoing (interventions implemented as appropriate)    Problem: Breastfeeding (Adult,NICU,[place],Obstetrics,Pediatric)  Goal: Signs and Symptoms of Listed Potential Problems Will be Absent or Manageable (Breastfeeding)  Outcome: Ongoing (interventions implemented as appropriate)

## 2017-09-14 ENCOUNTER — OFFICE VISIT (OUTPATIENT)
Dept: OBSTETRICS AND GYNECOLOGY | Facility: CLINIC | Age: 27
End: 2017-09-14

## 2017-09-14 VITALS
BODY MASS INDEX: 43.4 KG/M2 | SYSTOLIC BLOOD PRESSURE: 140 MMHG | HEIGHT: 69 IN | WEIGHT: 293 LBS | DIASTOLIC BLOOD PRESSURE: 84 MMHG

## 2017-09-14 DIAGNOSIS — E66.01 OBESITY, CLASS III, BMI 40-49.9 (MORBID OBESITY) (HCC): ICD-10-CM

## 2017-09-14 DIAGNOSIS — Z13.9 SCREENING: ICD-10-CM

## 2017-09-14 DIAGNOSIS — Z30.09 ENCOUNTER FOR OTHER GENERAL COUNSELING OR ADVICE ON CONTRACEPTION: Primary | ICD-10-CM

## 2017-09-14 PROBLEM — Z30.2 REQUEST FOR STERILIZATION: Status: RESOLVED | Noted: 2017-07-11 | Resolved: 2017-09-14

## 2017-09-14 PROBLEM — O99.210 OBESITY AFFECTING PREGNANCY: Status: RESOLVED | Noted: 2017-02-20 | Resolved: 2017-09-14

## 2017-09-14 PROBLEM — Z34.90 SUPERVISION OF NORMAL PREGNANCY: Status: RESOLVED | Noted: 2017-02-20 | Resolved: 2017-09-14

## 2017-09-14 PROBLEM — O13.3 GESTATIONAL HYPERTENSION W/O SIGNIFICANT PROTEINURIA IN 3RD TRIMESTER: Status: RESOLVED | Noted: 2017-08-09 | Resolved: 2017-09-14

## 2017-09-14 PROBLEM — O99.330 TOBACCO SMOKING AFFECTING PREGNANCY: Status: RESOLVED | Noted: 2017-02-20 | Resolved: 2017-09-14

## 2017-09-14 PROBLEM — Z34.90 PREGNANCY: Status: RESOLVED | Noted: 2017-09-08 | Resolved: 2017-09-14

## 2017-09-14 LAB
BILIRUB BLD-MCNC: NEGATIVE MG/DL
CLARITY, POC: CLEAR
COLOR UR: YELLOW
GLUCOSE UR STRIP-MCNC: NEGATIVE MG/DL
KETONES UR QL: NEGATIVE
LEUKOCYTE EST, POC: ABNORMAL
NITRITE UR-MCNC: NEGATIVE MG/ML
PH UR: 77 [PH] (ref 5–8)
PROT UR STRIP-MCNC: NEGATIVE MG/DL
RBC # UR STRIP: ABNORMAL /UL
SP GR UR: 1.01 (ref 1–1.03)
UROBILINOGEN UR QL: NORMAL

## 2017-09-14 PROCEDURE — 99213 OFFICE O/P EST LOW 20 MIN: CPT | Performed by: OBSTETRICS & GYNECOLOGY

## 2017-09-14 RX ORDER — SULFAMETHOXAZOLE AND TRIMETHOPRIM 800; 160 MG/1; MG/1
1 TABLET ORAL 2 TIMES DAILY
Qty: 14 TABLET | Refills: 0 | Status: SHIPPED | OUTPATIENT
Start: 2017-09-14 | End: 2017-09-21

## 2017-09-14 NOTE — PROGRESS NOTES
"Subjective   Debar Moreno is a 26 y.o. female who presents for a postpartum visit. She is 1 week postpartum following a low cervical transverse  section. I have fully reviewed the prenatal and intrapartum course. The delivery was at 39-0 gestational weeks. Outcome: repeat  section, low transverse incision. Anesthesia: spinal. Postpartum course has been uncomplicated. Baby's course has been uncomplicated. Baby is feeding by breast. Bleeding thin lochia. Bowel function is normal. Bladder function is normal. Patient is sexually active. Contraception method is IUD. Postpartum depression screening: negative.    The following portions of the patient's history were reviewed and updated as appropriate: allergies, current medications, past family history, past medical history, past social history, past surgical history and problem list.    Review of Systems  Pertinent items are noted in HPI.    Objective   /84  Ht 69\" (175.3 cm)  Wt (!) 318 lb (144 kg)  LMP 2016 (Exact Date)  BMI 46.96 kg/m2   General:  alert, appears stated age and cooperative   Abdomen: soft, non-tender; bowel sounds normal; no masses,  no organomegaly and staples removed - inc C/D/I - steri-strips placed      Assessment/Plan   postpartum exam.     1. Contraception: IUD  2. Inc check - start Bactrim - steristrips placed   3. Follow up in: 2 weeks or as needed.           "

## 2017-09-19 ENCOUNTER — TELEPHONE (OUTPATIENT)
Dept: OBSTETRICS AND GYNECOLOGY | Facility: CLINIC | Age: 27
End: 2017-09-19

## 2017-09-19 NOTE — TELEPHONE ENCOUNTER
Pt called requesting more pain medication, DR Barrios notified and instructions given to take 800mg of Motrin Q 8hrs and appt made for 9/22.prema

## 2017-09-22 ENCOUNTER — POSTPARTUM VISIT (OUTPATIENT)
Dept: OBSTETRICS AND GYNECOLOGY | Facility: CLINIC | Age: 27
End: 2017-09-22

## 2017-09-22 VITALS
WEIGHT: 293 LBS | SYSTOLIC BLOOD PRESSURE: 110 MMHG | HEIGHT: 69 IN | DIASTOLIC BLOOD PRESSURE: 80 MMHG | BODY MASS INDEX: 43.4 KG/M2

## 2017-09-22 DIAGNOSIS — Z13.9 SCREENING: ICD-10-CM

## 2017-09-22 DIAGNOSIS — Z30.8 ENCOUNTER FOR OTHER CONTRACEPTIVE MANAGEMENT: ICD-10-CM

## 2017-09-22 DIAGNOSIS — Z09 POSTOP CHECK: Primary | ICD-10-CM

## 2017-09-22 LAB
BILIRUB BLD-MCNC: NEGATIVE MG/DL
CLARITY, POC: CLEAR
COLOR UR: YELLOW
GLUCOSE UR STRIP-MCNC: NEGATIVE MG/DL
KETONES UR QL: NEGATIVE
LEUKOCYTE EST, POC: ABNORMAL
NITRITE UR-MCNC: NEGATIVE MG/ML
PH UR: 6 [PH] (ref 5–8)
PROT UR STRIP-MCNC: NEGATIVE MG/DL
RBC # UR STRIP: NEGATIVE /UL
SP GR UR: 1.01 (ref 1–1.03)
UROBILINOGEN UR QL: NORMAL

## 2017-09-22 PROCEDURE — 99213 OFFICE O/P EST LOW 20 MIN: CPT | Performed by: OBSTETRICS & GYNECOLOGY

## 2017-09-22 NOTE — PROGRESS NOTES
"[facility]  Postop  Visit    2017    Patient: Debra Moreno          MR#:[id number]    History of Present Illness    26 y.o. female  status post  delivery   Patient presents with complaint of lower abdominal pain and incisional tenderness.  She reports her lochia has been scant  ________________________________________  Patient Active Problem List   Diagnosis   • History of  delivery   • Obesity, Class III, BMI 40-49.9 (morbid obesity)       Past Medical History:   Diagnosis Date   • Anemia    • Bacterial vaginal infection 10/2015   • GC (gonococcus infection)    • Hypertension 2012    GESTATIONAL HYPERTENSION   • Obesity        Past Surgical History:   Procedure Laterality Date   •  SECTION     •  SECTION N/A 2017    Procedure:  SECTION REPEAT;  Surgeon: Angel Barrios MD;  Location: [facility] LABOR DELIVERY;  Service:        History   Smoking Status   • Current Every Day Smoker   • Packs/day: 0.50   • Types: Cigarettes   Smokeless Tobacco   • Never Used       has a current medication list which includes the following prescription(s): ibuprofen.  ________________________________________  Review of Systems   Gastrointestinal: Positive for abdominal pain.            Objective       /80  Ht 69\" (175.3 cm)  Wt (!) 310 lb (141 kg)  LMP 2016 (Exact Date)  BMI 45.78 kg/m2   BP Readings from Last 3 Encounters:   17 110/80   17 140/84   17 119/66      Wt Readings from Last 3 Encounters:   17 (!) 310 lb (141 kg)   17 (!) 318 lb (144 kg)   17 (!) 332 lb 6.4 oz (151 kg)      BMI: Estimated body mass index is 45.78 kg/(m^2) as calculated from the following:    Height as of this encounter: 69\" (175.3 cm).    Weight as of this encounter: 310 lb (141 kg).    EXAM     General:     Patient appears well in NAD  Abdomen: Soft, NT, +BS, no acute findings  Pelvic:  Scant " lochia  Incision: Dry, clean, intact healing without signs of infection  Ext:  No cyanosis, edema 1-2    Assessment:    1. Normal postop check  2. Contraception: undecided  - discussed at length, considering Mirena IUD    Plan:  Return for Annual exam     Angel Barrios MD  2017 2:11 PM  ________________________________________________________________  Delivery Details     Delivery: , Low Transverse     YOB: 2017    Time of Birth: 8:45 AM      Anesthesia: Spinal     Delivering clinician: Angel Barrios      Infant    Findings: male  infant     Infant observations: Weight: 6 lb 6.8 oz (2.915 kg)     Observations/Comments:  OR2 Bright      Apgars: 8   @ 1 minute /    9   @ 5 minutes         Estimated Blood Loss:    cc.

## 2019-05-07 ENCOUNTER — OFFICE VISIT (OUTPATIENT)
Dept: OBSTETRICS AND GYNECOLOGY | Age: 29
End: 2019-05-07

## 2019-05-07 VITALS
WEIGHT: 259.8 LBS | HEIGHT: 69 IN | SYSTOLIC BLOOD PRESSURE: 118 MMHG | DIASTOLIC BLOOD PRESSURE: 70 MMHG | BODY MASS INDEX: 38.48 KG/M2

## 2019-05-07 DIAGNOSIS — N89.8 VAGINAL DISCHARGE: ICD-10-CM

## 2019-05-07 DIAGNOSIS — Z01.419 WELL WOMAN EXAM WITH ROUTINE GYNECOLOGICAL EXAM: Primary | ICD-10-CM

## 2019-05-07 DIAGNOSIS — N76.0 BACTERIAL VAGINOSIS: ICD-10-CM

## 2019-05-07 DIAGNOSIS — B96.89 BACTERIAL VAGINOSIS: ICD-10-CM

## 2019-05-07 PROCEDURE — 99213 OFFICE O/P EST LOW 20 MIN: CPT | Performed by: NURSE PRACTITIONER

## 2019-05-07 PROCEDURE — 99395 PREV VISIT EST AGE 18-39: CPT | Performed by: NURSE PRACTITIONER

## 2019-05-07 RX ORDER — METRONIDAZOLE 500 MG/1
500 TABLET ORAL 2 TIMES DAILY
Qty: 14 TABLET | Refills: 0 | Status: SHIPPED | OUTPATIENT
Start: 2019-05-07 | End: 2019-05-14

## 2019-05-07 NOTE — PROGRESS NOTES
[facility]  Routine Annual Visit    2019    Patient: Debra Moreno          MR#:[id number]      History of Present Illness    28 y.o. female  who presents for annual exam. Last pap negative 2017. Debra is not on contraception and declines. Recommend prenatal vitamin, smoking cessation, and more weight loss. Reports normal, monthly cycles. Denies new medical hx. She complains today of milky copious discharge with odor x several months. Denies exposure to STI. Denies dysuria, pelvic pain, fever.    Patient's last menstrual period was 2019 (approximate).  Obstetric History:  OB History      Para Term  AB Living    2 2 2     2    SAB TAB Ectopic Molar Multiple Live Births            0 2        Obstetric Comments    2017 10w3d, dating ultrasound EDC 9/15/17 by LMP consistent with scan hb   2017 19w0d normal and completed anatomic survey, normal interval growth, male fetus hb   7.27.17 - 32-6 weeks EFW 57%, AC 41% - JHF   2017 37w0d normal interval jairo wth: EFW 41% AC 17%, BPP 8/8  hb   2017 38w0d reassuring BPP 8/8, BIN:12.7 hb              Menstrual History:     Patient's last menstrual period was 2019 (approximate).       Sexual History:       ________________________________________  Patient Active Problem List   Diagnosis   • History of  delivery   • Obesity, Class III, BMI 40-49.9 (morbid obesity) (CMS/HCC)       Past Medical History:   Diagnosis Date   • Anemia    • Bacterial vaginal infection 10/2015   • GC (gonococcus infection)    • Hypertension 2012    GESTATIONAL HYPERTENSION   • Obesity        Past Surgical History:   Procedure Laterality Date   •  SECTION     •  SECTION N/A 2017    Procedure:  SECTION REPEAT;  Surgeon: Angel Barrios MD;  Location: [facility] LABOR DELIVERY;  Service:        Social History     Tobacco Use   Smoking Status Current Every Day Smoker   • Packs/day:  "0.50   • Types: Cigarettes   Smokeless Tobacco Never Used       Family History   Problem Relation Age of Onset   • Hypertension Maternal Grandmother    • Hypertension Maternal Grandfather    • Liver cancer Maternal Grandfather    • Diabetes Other    • Diabetes Paternal Grandfather    • Lung cancer Paternal Grandfather        Prior to Admission medications    Medication Sig Start Date End Date Taking? Authorizing Provider   ibuprofen (ADVIL,MOTRIN) 800 MG tablet Take 1 tablet by mouth Every 8 (Eight) Hours As Needed for Mild Pain . 9/12/17   Balbina Khan MD     ________________________________________    The following portions of the patient's history were reviewed and updated as appropriate: allergies, current medications, past family history, past medical history, past social history, past surgical history and problem list.    Review of Systems   Constitutional: Negative.    HENT: Negative.    Eyes: Negative for visual disturbance.   Respiratory: Negative for cough, shortness of breath and wheezing.    Cardiovascular: Negative for chest pain, palpitations and leg swelling.   Gastrointestinal: Negative for abdominal distention, abdominal pain, blood in stool, constipation, diarrhea, nausea and vomiting.   Endocrine: Negative for cold intolerance and heat intolerance.   Genitourinary: Positive for vaginal discharge. Negative for difficulty urinating, dyspareunia, dysuria, frequency, genital sores, hematuria, menstrual problem, pelvic pain, urgency, vaginal bleeding and vaginal pain.   Musculoskeletal: Negative.    Skin: Negative.    Neurological: Negative for dizziness, weakness, light-headedness, numbness and headaches.   Hematological: Negative.    Psychiatric/Behavioral: Negative.    Breasts: negative for lumps skin changes, dimpling, swelling, nipple changes/discharge bilaterally       Objective   Physical Exam    /70   Ht 175.3 cm (69\")   Wt 118 kg (259 lb 12.8 oz)   LMP 04/22/2019 (Approximate) " "Comment: approx 3 weeks ago  Breastfeeding? No   BMI 38.37 kg/m²    BP Readings from Last 3 Encounters:   05/07/19 118/70   09/22/17 110/80   09/14/17 140/84      Wt Readings from Last 3 Encounters:   05/07/19 118 kg (259 lb 12.8 oz)   09/22/17 (!) 141 kg (310 lb)   09/14/17 (!) 144 kg (318 lb)        BMI: Estimated body mass index is 38.37 kg/m² as calculated from the following:    Height as of this encounter: 175.3 cm (69\").    Weight as of this encounter: 118 kg (259 lb 12.8 oz).            General:   alert, appears stated age and cooperative   Heart: regular rate and rhythm, S1, S2 normal, no murmur, click, rub or gallop   Lungs: clear to auscultation bilaterally   Abdomen: soft, non-tender, without masses or organomegaly   Breast: inspection negative, no nipple discharge or bleeding, no masses or nodularity palpable   Vulva: normal   Vagina: normal mucosa, thin grey discharge   Cervix: no cervical motion tenderness and no lesions   Uterus: exam is limited habitus    Adnexa: exam limited by habitus     Assessment:    1. Normal annual exam   2. Vaginal discharge- treat with flagyl while awaiting culture  3. Counseled on healthy lifestyle modifications, safe sex, condom use, and self breast exams.      Plan:    []  Rx:   []  Mammogram request made  [x]  PAP done  []  Occult fecal blood test ([facility])  []  Labs:   [x]  GC/Chl/TV  []  DEXA scan   []  Referral for colonoscopy:           Carlota Jauregui, ÁNGEL  5/7/2019 9:56 AM  "

## 2019-05-09 ENCOUNTER — TELEPHONE (OUTPATIENT)
Dept: OBSTETRICS AND GYNECOLOGY | Age: 29
End: 2019-05-09

## 2019-05-09 LAB
CONV .: NORMAL
CYTOLOGIST CVX/VAG CYTO: NORMAL
CYTOLOGY CVX/VAG DOC CYTO: NORMAL
CYTOLOGY CVX/VAG DOC THIN PREP: NORMAL
DX ICD CODE: NORMAL
HIV 1 & 2 AB SER-IMP: NORMAL
OTHER STN SPEC: NORMAL
STAT OF ADQ CVX/VAG CYTO-IMP: NORMAL

## 2019-05-10 LAB
A VAGINAE DNA VAG QL NAA+PROBE: ABNORMAL SCORE
BVAB2 DNA VAG QL NAA+PROBE: ABNORMAL SCORE
C ALBICANS DNA VAG QL NAA+PROBE: NEGATIVE
C GLABRATA DNA VAG QL NAA+PROBE: NEGATIVE
C TRACH RRNA SPEC QL NAA+PROBE: NEGATIVE
MEGA1 DNA VAG QL NAA+PROBE: ABNORMAL SCORE
N GONORRHOEA RRNA SPEC QL NAA+PROBE: NEGATIVE
T VAGINALIS RRNA SPEC QL NAA+PROBE: NEGATIVE

## 2019-08-28 ENCOUNTER — PROCEDURE VISIT (OUTPATIENT)
Dept: OBSTETRICS AND GYNECOLOGY | Age: 29
End: 2019-08-28

## 2019-08-28 ENCOUNTER — OFFICE VISIT (OUTPATIENT)
Dept: OBSTETRICS AND GYNECOLOGY | Age: 29
End: 2019-08-28

## 2019-08-28 VITALS
WEIGHT: 241 LBS | HEIGHT: 69 IN | BODY MASS INDEX: 35.7 KG/M2 | SYSTOLIC BLOOD PRESSURE: 128 MMHG | DIASTOLIC BLOOD PRESSURE: 68 MMHG

## 2019-08-28 DIAGNOSIS — E66.01 OBESITY, CLASS III, BMI 40-49.9 (MORBID OBESITY) (HCC): ICD-10-CM

## 2019-08-28 DIAGNOSIS — Z34.80 SUPERVISION OF OTHER NORMAL PREGNANCY, ANTEPARTUM: Primary | ICD-10-CM

## 2019-08-28 DIAGNOSIS — O36.80X0 ENCOUNTER TO DETERMINE FETAL VIABILITY OF PREGNANCY, SINGLE OR UNSPECIFIED FETUS: Primary | ICD-10-CM

## 2019-08-28 PROCEDURE — 99214 OFFICE O/P EST MOD 30 MIN: CPT | Performed by: NURSE PRACTITIONER

## 2019-08-28 PROCEDURE — 76817 TRANSVAGINAL US OBSTETRIC: CPT | Performed by: OBSTETRICS & GYNECOLOGY

## 2019-08-28 RX ORDER — CALCIUM CITRATE, IRON PENTACARBONYL, CHOLECALCIFEROL, .ALPHA.-TOCOPHEROL, DL-, PYRIDOXINE HYDROCHLORIDE, FOLIC ACID, DOCUSATE SODIUM, AND DOCONEXENT 104; 27; 400; 30; 25; 1; 50; 260 MG/1; MG/1; [IU]/1; [IU]/1; MG/1; MG/1; MG/1; MG/1
1 CAPSULE, GELATIN COATED ORAL DAILY
Qty: 30 CAPSULE | Refills: 6 | Status: SHIPPED | OUTPATIENT
Start: 2019-08-28 | End: 2020-04-07

## 2019-08-28 NOTE — PROGRESS NOTES
[facility]  Routine Annual Visit    2019    Patient: Debra Moreno          MR#:[id number]      History of Present Illness    28 y.o. female  who presents for confirmation of pregnancy. US confirms IUP at 6 weeks 4 day. LMP rejected. This is Debra's 3rd pregnancy.  x 2 at term. Delivery notes in system.     She is current on pap smear screening. Treated for BV in May- reports symptoms resolved with flagyl.     No complaints or concerns today. Feeling well.     Patient's last menstrual period was 2019.  Obstetric History:  OB History      Para Term  AB Living    2 2 2     2    SAB TAB Ectopic Molar Multiple Live Births            0 2        Obstetric Comments    2017 10w3d, dating ultrasound EDC 9/15/17 by LMP consistent with scan hb   2017 19w0d normal and completed anatomic survey, normal interval growth, male fetus hb   7.27.17 - 32-6 weeks EFW 57%, AC 41% - JHF   2017 37w0d normal interval jairo wth: EFW 41% AC 17%, BPP 8/8  hb   2017 38w0d reassuring BPP 8/8, BIN:12.7 hb              Menstrual History:     Patient's last menstrual period was 2019.       Sexual History:       ________________________________________  Patient Active Problem List   Diagnosis   • History of  delivery   • Obesity, Class III, BMI 40-49.9 (morbid obesity) (CMS/HCC)       Past Medical History:   Diagnosis Date   • Anemia    • Bacterial vaginal infection 10/2015   • GC (gonococcus infection)    • Hypertension 2012    GESTATIONAL HYPERTENSION   • Obesity        Past Surgical History:   Procedure Laterality Date   •  SECTION     •  SECTION N/A 2017    Procedure:  SECTION REPEAT;  Surgeon: Angel Barrios MD;  Location: [facility] LABOR DELIVERY;  Service:        Social History     Tobacco Use   Smoking Status Current Every Day Smoker   • Packs/day: 0.50   • Types: Cigarettes   Smokeless Tobacco Never Used  "      Family History   Problem Relation Age of Onset   • Hypertension Maternal Grandmother    • Hypertension Maternal Grandfather    • Liver cancer Maternal Grandfather    • Diabetes Other    • Diabetes Paternal Grandfather    • Lung cancer Paternal Grandfather        Prior to Admission medications    Medication Sig Start Date End Date Taking? Authorizing Provider   ibuprofen (ADVIL,MOTRIN) 800 MG tablet Take 1 tablet by mouth Every 8 (Eight) Hours As Needed for Mild Pain . 9/12/17   Balbina Khan MD     ________________________________________    The following portions of the patient's history were reviewed and updated as appropriate: allergies, current medications, past family history, past medical history, past social history, past surgical history and problem list.    Review of Systems   Constitutional: Negative for chills, fatigue and fever.   Gastrointestinal: Negative for abdominal distention, abdominal pain, constipation and nausea.   Genitourinary: Negative for decreased urine volume, difficulty urinating, dyspareunia, dysuria, frequency, genital sores, hematuria, menstrual problem, pelvic pain, urgency, vaginal bleeding, vaginal discharge and vaginal pain.       Objective   Physical Exam   Constitutional: She is oriented to person, place, and time. She appears well-developed and well-nourished. No distress.   Cardiovascular: Normal rate and regular rhythm.   Pulmonary/Chest: Effort normal and breath sounds normal.   Neurological: She is alert and oriented to person, place, and time.   Skin: Skin is warm and dry.   Psychiatric: She has a normal mood and affect. Her behavior is normal.       /68   Ht 175.3 cm (69\")   Wt 109 kg (241 lb)   LMP 07/14/2019   BMI 35.59 kg/m²    BP Readings from Last 3 Encounters:   08/28/19 128/68   05/07/19 118/70   09/22/17 110/80      Wt Readings from Last 3 Encounters:   08/28/19 109 kg (241 lb)   05/07/19 118 kg (259 lb 12.8 oz)   09/22/17 (!) 141 kg (310 lb)    " "    BMI: Estimated body mass index is 35.59 kg/m² as calculated from the following:    Height as of this encounter: 175.3 cm (69\").    Weight as of this encounter: 109 kg (241 lb).      Assessment:    1. IUP at 6 weeks 4 days  2. Early pregnancy counseling provided   Patient is taking Prenatal vitamins  Problem list reviewed and updated  Reviewed routine prenatal care with the office to include but not limited to expected weight gain during pregnancy, Tylenol products are fine, avoid aspirin and ibuprofen; Zika (travel restrictions/ok to use insect repellant); not to change cat litter; food restrictions; avoidance of alcohol, tobacco, drugs and saunas/hot tubs.   All questions answered.  3. SAB warnings    RTO 4 weeks OB intake    Plan:    []  Rx:   []  Mammogram request made  []  PAP done  []  Occult fecal blood test ([facility]ure)  []  Labs:   []  GC/Chl/TV  []  DEXA scan   []  Referral for colonoscopy:           Carlota Jauregui, ÁNGEL  8/28/2019 12:56 PM  "

## 2019-08-30 LAB
BACTERIA UR CULT: NORMAL
BACTERIA UR CULT: NORMAL
C TRACH RRNA VAG QL NAA+PROBE: NEGATIVE
N GONORRHOEA RRNA VAG QL NAA+PROBE: NEGATIVE
T VAGINALIS RRNA VAG QL NAA+PROBE: NEGATIVE

## 2019-10-01 ENCOUNTER — INITIAL PRENATAL (OUTPATIENT)
Dept: OBSTETRICS AND GYNECOLOGY | Age: 29
End: 2019-10-01

## 2019-10-01 VITALS — DIASTOLIC BLOOD PRESSURE: 72 MMHG | SYSTOLIC BLOOD PRESSURE: 124 MMHG | BODY MASS INDEX: 36.92 KG/M2 | WEIGHT: 250 LBS

## 2019-10-01 DIAGNOSIS — O99.211 OBESITY AFFECTING PREGNANCY IN FIRST TRIMESTER: ICD-10-CM

## 2019-10-01 DIAGNOSIS — Z98.891 HISTORY OF 2 CESAREAN SECTIONS: ICD-10-CM

## 2019-10-01 DIAGNOSIS — Z34.81 PRENATAL CARE, SUBSEQUENT PREGNANCY IN FIRST TRIMESTER: Primary | ICD-10-CM

## 2019-10-01 DIAGNOSIS — O99.331 TOBACCO SMOKING AFFECTING PREGNANCY IN FIRST TRIMESTER: ICD-10-CM

## 2019-10-01 DIAGNOSIS — Z13.89 SCREENING FOR BLOOD OR PROTEIN IN URINE: ICD-10-CM

## 2019-10-01 PROBLEM — O99.210 OBESITY COMPLICATING PREGNANCY: Status: ACTIVE | Noted: 2017-09-14

## 2019-10-01 LAB
BILIRUB BLD-MCNC: NEGATIVE MG/DL
GLUCOSE UR STRIP-MCNC: NEGATIVE MG/DL
HEMOGLOBIN FRACTIONATION: NORMAL
KETONES UR QL: NEGATIVE
LEUKOCYTE EST, POC: NEGATIVE
NITRITE UR-MCNC: NEGATIVE MG/ML
PH UR: 6 [PH] (ref 5–8)
PROT UR STRIP-MCNC: NEGATIVE MG/DL
RBC # UR STRIP: NEGATIVE /UL
SP GR UR: 1.02 (ref 1–1.03)
UROBILINOGEN UR QL: NORMAL

## 2019-10-01 PROCEDURE — 99214 OFFICE O/P EST MOD 30 MIN: CPT | Performed by: OBSTETRICS & GYNECOLOGY

## 2019-10-01 NOTE — PROGRESS NOTES
Chief Complaint   Patient presents with   • Routine Prenatal Visit     ob intake , no c/o , feeling great , denies flu vaccine today        HPI: 28 y.o.  at 11w3d presents for prenatal care - denies complaints      Vitals:    10/01/19 0913   BP: 124/72   Weight: 113 kg (250 lb)       ROS:  GI:  Negative  : neg  Pulmonary: Negative     A/P  1. Intrauterine pregnancy at 11w3d   2. Pregnancy Risk:  HIGH RISK    Debra was seen today for routine prenatal visit.    Diagnoses and all orders for this visit:    Prenatal care, subsequent pregnancy in first trimester    Screening for blood or protein in urine  -     POC Urinalysis Dipstick    Obesity affecting pregnancy in first trimester    Tobacco smoking affecting pregnancy in first trimester    History of 2  sections        -----------------------  PLAN:   Return in about 4 weeks (around 10/29/2019), or ob chehck .  Prenatal labs today   H/O 2  sections  OB intake done   Declines flu vacc  Desires cfDNA   Counseling was given to patient for the following topics: diagnostic results, instructions for management, risk factor reductions and patient and family education . Total time of the encounter was 25 minutes and 20 minutes was spend counseling.    Rosario Kearns MD  10/1/2019 9:45 AM

## 2019-10-02 LAB
ABO GROUP BLD: NORMAL
ALBUMIN SERPL-MCNC: 3.9 G/DL (ref 3.5–5.2)
ALBUMIN/GLOB SERPL: 1.4 G/DL
ALP SERPL-CCNC: 55 U/L (ref 39–117)
ALT SERPL-CCNC: 11 U/L (ref 1–33)
AST SERPL-CCNC: 11 U/L (ref 1–32)
BILIRUB SERPL-MCNC: <0.2 MG/DL (ref 0.2–1.2)
BLD GP AB SCN SERPL QL: NEGATIVE
BUN SERPL-MCNC: 10 MG/DL (ref 6–20)
BUN/CREAT SERPL: 21.3 (ref 7–25)
CALCIUM SERPL-MCNC: 8.8 MG/DL (ref 8.6–10.5)
CHLORIDE SERPL-SCNC: 104 MMOL/L (ref 98–107)
CO2 SERPL-SCNC: 23.9 MMOL/L (ref 22–29)
CREAT SERPL-MCNC: 0.47 MG/DL (ref 0.57–1)
ERYTHROCYTE [DISTWIDTH] IN BLOOD BY AUTOMATED COUNT: 13.3 % (ref 12.3–15.4)
GLOBULIN SER CALC-MCNC: 2.7 GM/DL
GLUCOSE SERPL-MCNC: 88 MG/DL (ref 65–99)
HBA1C MFR BLD: 5.4 % (ref 4.8–5.6)
HBV SURFACE AG SERPL QL IA: NEGATIVE
HCT VFR BLD AUTO: 35.3 % (ref 34–46.6)
HCV AB S/CO SERPL IA: <0.1 S/CO RATIO (ref 0–0.9)
HGB A MFR BLD: 97.9 % (ref 96.4–98.8)
HGB A2 MFR BLD COLUMN CHROM: 2.1 % (ref 1.8–3.2)
HGB BLD-MCNC: 11.7 G/DL (ref 12–15.9)
HGB C MFR BLD: 0 %
HGB F MFR BLD: 0 % (ref 0–2)
HGB FRACT BLD-IMP: NORMAL
HGB S BLD QL SOLY: NEGATIVE
HGB S MFR BLD: 0 %
HIV 1+2 AB+HIV1 P24 AG SERPL QL IA: NON REACTIVE
MCH RBC QN AUTO: 29.5 PG (ref 26.6–33)
MCHC RBC AUTO-ENTMCNC: 33.1 G/DL (ref 31.5–35.7)
MCV RBC AUTO: 88.9 FL (ref 79–97)
PLATELET # BLD AUTO: 307 10*3/MM3 (ref 140–450)
POTASSIUM SERPL-SCNC: 4.7 MMOL/L (ref 3.5–5.2)
PROT SERPL-MCNC: 6.6 G/DL (ref 6–8.5)
RBC # BLD AUTO: 3.97 10*6/MM3 (ref 3.77–5.28)
RH BLD: POSITIVE
RPR SER QL: NORMAL
RUBV IGG SERPL IA-ACNC: 8.8 INDEX
SODIUM SERPL-SCNC: 140 MMOL/L (ref 136–145)
TSH SERPL DL<=0.005 MIU/L-ACNC: 0.72 UIU/ML (ref 0.27–4.2)
VZV IGG SER IA-ACNC: 289 INDEX
WBC # BLD AUTO: 8.48 10*3/MM3 (ref 3.4–10.8)

## 2019-10-09 ENCOUNTER — TELEPHONE (OUTPATIENT)
Dept: OBSTETRICS AND GYNECOLOGY | Age: 29
End: 2019-10-09

## 2019-10-09 NOTE — TELEPHONE ENCOUNTER
----- Message from Rosario Kearns MD sent at 10/9/2019  9:15 AM EDT -----  Please call patient and notify of normal results of genetic testing and it's a girl

## 2019-10-29 ENCOUNTER — ROUTINE PRENATAL (OUTPATIENT)
Dept: OBSTETRICS AND GYNECOLOGY | Age: 29
End: 2019-10-29

## 2019-10-29 VITALS — DIASTOLIC BLOOD PRESSURE: 70 MMHG | SYSTOLIC BLOOD PRESSURE: 120 MMHG | BODY MASS INDEX: 36.77 KG/M2 | WEIGHT: 249 LBS

## 2019-10-29 DIAGNOSIS — Z34.82 PRENATAL CARE, SUBSEQUENT PREGNANCY IN SECOND TRIMESTER: Primary | ICD-10-CM

## 2019-10-29 DIAGNOSIS — O99.212 OBESITY AFFECTING PREGNANCY IN SECOND TRIMESTER: ICD-10-CM

## 2019-10-29 DIAGNOSIS — Z98.891 HISTORY OF 2 CESAREAN SECTIONS: ICD-10-CM

## 2019-10-29 DIAGNOSIS — O99.332 TOBACCO SMOKING AFFECTING PREGNANCY IN SECOND TRIMESTER: ICD-10-CM

## 2019-10-29 DIAGNOSIS — Z13.89 SCREENING FOR BLOOD OR PROTEIN IN URINE: ICD-10-CM

## 2019-10-29 LAB
BILIRUB BLD-MCNC: NEGATIVE MG/DL
GLUCOSE UR STRIP-MCNC: NEGATIVE MG/DL
KETONES UR QL: NEGATIVE
LEUKOCYTE EST, POC: ABNORMAL
NITRITE UR-MCNC: NEGATIVE MG/ML
PH UR: 8 [PH] (ref 5–8)
PROT UR STRIP-MCNC: NEGATIVE MG/DL
RBC # UR STRIP: NEGATIVE /UL
SP GR UR: 1.01 (ref 1–1.03)
UROBILINOGEN UR QL: NORMAL

## 2019-10-29 PROCEDURE — 99213 OFFICE O/P EST LOW 20 MIN: CPT | Performed by: OBSTETRICS & GYNECOLOGY

## 2019-10-29 NOTE — PROGRESS NOTES
Chief Complaint   Patient presents with   • Routine Prenatal Visit     cc: dizziness , leg cramps        HPI: 29 y.o.  at 15w3d presents for prenatal care - c/o carolyn horses - advised H20 and banana before bed    Vitals:    10/29/19 1048   BP: 120/70   Weight: 113 kg (249 lb)       ROS:  GI:  Negative  : neg  Pulmonary: Negative     A/P  1. Intrauterine pregnancy at 15w3d   2. Pregnancy Risk:  HIGH RISK    Debra was seen today for routine prenatal visit.    Diagnoses and all orders for this visit:    Prenatal care, subsequent pregnancy in second trimester    Screening for blood or protein in urine  -     POC Urinalysis Dipstick    Tobacco smoking affecting pregnancy in second trimester    History of 2  sections    Obesity affecting pregnancy in second trimester        -----------------------  PLAN:   Return in about 4 weeks (around 2019), or ob check and anatomy uS .  Declines flu vacc  cfDNA neg   H/O 2  sections repeat at 39 weeks   SAB warnings        Rosario Kearns MD  10/29/2019 11:18 AM

## 2019-11-26 ENCOUNTER — ROUTINE PRENATAL (OUTPATIENT)
Dept: OBSTETRICS AND GYNECOLOGY | Age: 29
End: 2019-11-26

## 2019-11-26 ENCOUNTER — PROCEDURE VISIT (OUTPATIENT)
Dept: OBSTETRICS AND GYNECOLOGY | Age: 29
End: 2019-11-26

## 2019-11-26 VITALS — BODY MASS INDEX: 39.13 KG/M2 | DIASTOLIC BLOOD PRESSURE: 70 MMHG | SYSTOLIC BLOOD PRESSURE: 122 MMHG | WEIGHT: 265 LBS

## 2019-11-26 DIAGNOSIS — Z98.891 HISTORY OF 2 CESAREAN SECTIONS: ICD-10-CM

## 2019-11-26 DIAGNOSIS — O99.212 OBESITY AFFECTING PREGNANCY IN SECOND TRIMESTER: ICD-10-CM

## 2019-11-26 DIAGNOSIS — O99.332 TOBACCO SMOKING AFFECTING PREGNANCY IN SECOND TRIMESTER: Primary | ICD-10-CM

## 2019-11-26 DIAGNOSIS — Z36.86 ENCOUNTER FOR ANTENATAL SCREENING FOR CERVICAL LENGTH: ICD-10-CM

## 2019-11-26 DIAGNOSIS — O99.332 TOBACCO SMOKING AFFECTING PREGNANCY IN SECOND TRIMESTER: ICD-10-CM

## 2019-11-26 DIAGNOSIS — Z13.89 SCREENING FOR BLOOD OR PROTEIN IN URINE: Primary | ICD-10-CM

## 2019-11-26 DIAGNOSIS — Z36.89 SCREENING, ANTENATAL, FOR FETAL ANATOMIC SURVEY: ICD-10-CM

## 2019-11-26 DIAGNOSIS — O44.00 PLACENTA PREVIA ANTEPARTUM: ICD-10-CM

## 2019-11-26 LAB
BILIRUB BLD-MCNC: NEGATIVE MG/DL
GLUCOSE UR STRIP-MCNC: NEGATIVE MG/DL
KETONES UR QL: ABNORMAL
LEUKOCYTE EST, POC: ABNORMAL
NITRITE UR-MCNC: NEGATIVE MG/ML
PH UR: 7 [PH] (ref 5–8)
PROT UR STRIP-MCNC: NEGATIVE MG/DL
RBC # UR STRIP: NEGATIVE /UL
SP GR UR: 1.01 (ref 1–1.03)
UROBILINOGEN UR QL: NORMAL

## 2019-11-26 PROCEDURE — 76817 TRANSVAGINAL US OBSTETRIC: CPT | Performed by: OBSTETRICS & GYNECOLOGY

## 2019-11-26 PROCEDURE — 99213 OFFICE O/P EST LOW 20 MIN: CPT | Performed by: OBSTETRICS & GYNECOLOGY

## 2019-11-26 PROCEDURE — 76805 OB US >/= 14 WKS SNGL FETUS: CPT | Performed by: OBSTETRICS & GYNECOLOGY

## 2019-11-26 NOTE — PROGRESS NOTES
Chief Complaint   Patient presents with   • Routine Prenatal Visit     cc: anatomy scan today , no c/o today       HPI: 29 y.o.  at 19w3d presents  For prenatal care - denies complaints     Vitals:    19 1047   BP: 122/70   Weight: 120 kg (265 lb)       ROS:  GI:  Negative  : neg  Pulmonary: Negative     A/P  1. Intrauterine pregnancy at 19w3d   2. Pregnancy Risk:  HIGH RISK    Debra was seen today for routine prenatal visit.    Diagnoses and all orders for this visit:    Screening for blood or protein in urine  -     POC Urinalysis Dipstick    Obesity affecting pregnancy in second trimester    History of 2  sections    Placenta previa antepartum    Tobacco smoking affecting pregnancy in second trimester        -----------------------  PLAN:   Return in about 5 weeks (around 2019), or ob check/growth US/placenta  .  Placenta previa- advised strict pelvic rest - discussed concern for accreta  Normal completed anatomy today   Tobacco use - enc cessation  Obesity - check growth next visit   Declines AFP     Rosario Kearns MD  2019 1:29 PM

## 2019-12-31 ENCOUNTER — ROUTINE PRENATAL (OUTPATIENT)
Dept: OBSTETRICS AND GYNECOLOGY | Age: 29
End: 2019-12-31

## 2019-12-31 ENCOUNTER — PROCEDURE VISIT (OUTPATIENT)
Dept: OBSTETRICS AND GYNECOLOGY | Age: 29
End: 2019-12-31

## 2019-12-31 VITALS — WEIGHT: 272 LBS | BODY MASS INDEX: 40.17 KG/M2

## 2019-12-31 DIAGNOSIS — Z13.89 SCREENING FOR BLOOD OR PROTEIN IN URINE: ICD-10-CM

## 2019-12-31 DIAGNOSIS — Z98.891 HISTORY OF 2 CESAREAN SECTIONS: ICD-10-CM

## 2019-12-31 DIAGNOSIS — O99.332 TOBACCO SMOKING AFFECTING PREGNANCY IN SECOND TRIMESTER: ICD-10-CM

## 2019-12-31 DIAGNOSIS — O44.00 PLACENTA PREVIA ANTEPARTUM: ICD-10-CM

## 2019-12-31 DIAGNOSIS — O99.212 OBESITY AFFECTING PREGNANCY IN SECOND TRIMESTER: ICD-10-CM

## 2019-12-31 DIAGNOSIS — O99.212 OBESITY AFFECTING PREGNANCY IN SECOND TRIMESTER: Primary | ICD-10-CM

## 2019-12-31 DIAGNOSIS — Z34.82 PRENATAL CARE, SUBSEQUENT PREGNANCY IN SECOND TRIMESTER: Primary | ICD-10-CM

## 2019-12-31 LAB
BILIRUB BLD-MCNC: NEGATIVE MG/DL
GLUCOSE UR STRIP-MCNC: NEGATIVE MG/DL
KETONES UR QL: NEGATIVE
LEUKOCYTE EST, POC: NEGATIVE
NITRITE UR-MCNC: NEGATIVE MG/ML
PH UR: 7 [PH] (ref 5–8)
PROT UR STRIP-MCNC: NEGATIVE MG/DL
RBC # UR STRIP: NEGATIVE /UL
SP GR UR: 1.01 (ref 1–1.03)
UROBILINOGEN UR QL: NORMAL

## 2019-12-31 PROCEDURE — 76816 OB US FOLLOW-UP PER FETUS: CPT | Performed by: OBSTETRICS & GYNECOLOGY

## 2019-12-31 PROCEDURE — 99213 OFFICE O/P EST LOW 20 MIN: CPT | Performed by: OBSTETRICS & GYNECOLOGY

## 2019-12-31 NOTE — PROGRESS NOTES
Chief Complaint   Patient presents with   • Routine Prenatal Visit     cc: no c/o        HPI: 29 y.o.  at 24w3d presents for prenatal care - denies bleeding or complaints      Vitals:    19 1112   Weight: 123 kg (272 lb)       ROS:  GI:  Negative  : neg   Pulmonary: Negative     A/P  1. Intrauterine pregnancy at 24w3d   2. Pregnancy Risk:  HIGH RISK    Debra was seen today for routine prenatal visit.    Diagnoses and all orders for this visit:    Screening for blood or protein in urine  -     POC Urinalysis Dipstick    Obesity affecting pregnancy in second trimester    History of 2  sections  -     Ambulatory Referral to Perinatology    Placenta previa antepartum  -     Ambulatory Referral to Perinatology    Tobacco smoking affecting pregnancy in second trimester        -----------------------  PLAN:   Return in about 2 weeks (around 2020), or ob check and one-hour gtt .  Complete previa with 2 prior sections - to [facility] to evaluate for accreta  Normal growth on US today - EFW 61%  Tobacco use - enc cessation   Strict pelvic rest and bleeding warnings     Rosario Kearns MD  2019 12:52 PM

## 2020-01-13 ENCOUNTER — ROUTINE PRENATAL (OUTPATIENT)
Dept: OBSTETRICS AND GYNECOLOGY | Age: 30
End: 2020-01-13

## 2020-01-13 VITALS — BODY MASS INDEX: 40.91 KG/M2 | DIASTOLIC BLOOD PRESSURE: 60 MMHG | WEIGHT: 277 LBS | SYSTOLIC BLOOD PRESSURE: 124 MMHG

## 2020-01-13 DIAGNOSIS — Z13.0 SCREENING FOR IRON DEFICIENCY ANEMIA: ICD-10-CM

## 2020-01-13 DIAGNOSIS — Z13.89 SCREENING FOR BLOOD OR PROTEIN IN URINE: ICD-10-CM

## 2020-01-13 DIAGNOSIS — Z13.1 SCREENING FOR DIABETES MELLITUS: ICD-10-CM

## 2020-01-13 DIAGNOSIS — O44.00 PLACENTA PREVIA ANTEPARTUM: ICD-10-CM

## 2020-01-13 DIAGNOSIS — Z3A.26 26 WEEKS GESTATION OF PREGNANCY: Primary | ICD-10-CM

## 2020-01-13 LAB
BILIRUB BLD-MCNC: NEGATIVE MG/DL
CLARITY, POC: CLEAR
COLOR UR: YELLOW
GLUCOSE 1H P 50 G GLC PO SERPL-MCNC: 103 MG/DL (ref 65–179)
GLUCOSE UR STRIP-MCNC: ABNORMAL MG/DL
HCT VFR BLD AUTO: 31.1 % (ref 34–46.6)
HGB BLD-MCNC: 10.7 G/DL (ref 12–15.9)
KETONES UR QL: NEGATIVE
LEUKOCYTE EST, POC: ABNORMAL
NITRITE UR-MCNC: NEGATIVE MG/ML
PH UR: 7 [PH] (ref 5–8)
PROT UR STRIP-MCNC: NEGATIVE MG/DL
RBC # UR STRIP: ABNORMAL /UL
SP GR UR: 1.01 (ref 1–1.03)
UROBILINOGEN UR QL: NORMAL

## 2020-01-13 PROCEDURE — 90471 IMMUNIZATION ADMIN: CPT | Performed by: NURSE PRACTITIONER

## 2020-01-13 PROCEDURE — 90715 TDAP VACCINE 7 YRS/> IM: CPT | Performed by: NURSE PRACTITIONER

## 2020-01-13 PROCEDURE — 99213 OFFICE O/P EST LOW 20 MIN: CPT | Performed by: NURSE PRACTITIONER

## 2020-01-13 NOTE — PROGRESS NOTES
Chief Complaint   Patient presents with   • Routine Prenatal Visit       HPI: 29 y.o.  at 26w2d presents for routine OB visit without complaint. Feeling well.    Denies bleeding, lof, ctx  Reports active fetus  Pt has upcoming appointment in 2 days (1/15/2020) with [facility] for previa- pt aware of date, time, and location    1 hr gtt today. O pos. Tdap given.    Vitals:    20 0934   BP: 124/60   Weight: 126 kg (277 lb)       ROS:  GI:  Negative  : Negative  Pulmonary: Negative     A/P  1. Intrauterine pregnancy at 26w2d   2. Pregnancy Risk:  COMPLICATED    Debra was seen today for routine prenatal visit.    Diagnoses and all orders for this visit:    26 weeks gestation of pregnancy    Screening for blood or protein in urine  -     POC Urinalysis Dipstick    Screening for iron deficiency anemia  -     Hemoglobin & Hematocrit, Blood    Screening for diabetes mellitus  -     Gestational Screen 1 Hr ([facility])    Placenta previa antepartum    Other orders  -     Tdap Vaccine Greater Than or Equal To 8yo IM        -----------------------  PLAN:   Previa- reinforced pelvic rest and bleeding precautions. MFM appointment 1/15/2020  Reinforced smoking cessation  RTO 2 weeks with Dr. Kearns  Call for any bleeding or other concerns      Carlota Jauregui, APRN  2020 10:02 AM'

## 2020-01-16 ENCOUNTER — TELEPHONE (OUTPATIENT)
Dept: OBSTETRICS AND GYNECOLOGY | Age: 30
End: 2020-01-16

## 2020-01-16 PROBLEM — O99.019 MATERNAL ANEMIA IN PREGNANCY, ANTEPARTUM: Status: ACTIVE | Noted: 2020-01-16

## 2020-01-16 RX ORDER — FERROUS SULFATE 325(65) MG
325 TABLET ORAL
Qty: 30 TABLET | Refills: 3 | Status: SHIPPED | OUTPATIENT
Start: 2020-01-16 | End: 2020-04-07

## 2020-01-16 NOTE — TELEPHONE ENCOUNTER
----- Message from ÁNGEL Mercedes sent at 1/16/2020  9:52 AM EST -----  Please notify pt she passed her 1 hour glucose screening. She is anemic so I have sent an iron supplement to her pharmacy.

## 2020-01-28 ENCOUNTER — ROUTINE PRENATAL (OUTPATIENT)
Dept: OBSTETRICS AND GYNECOLOGY | Age: 30
End: 2020-01-28

## 2020-01-28 VITALS — DIASTOLIC BLOOD PRESSURE: 74 MMHG | WEIGHT: 278 LBS | SYSTOLIC BLOOD PRESSURE: 125 MMHG | BODY MASS INDEX: 41.05 KG/M2

## 2020-01-28 DIAGNOSIS — Z13.89 SCREENING FOR BLOOD OR PROTEIN IN URINE: ICD-10-CM

## 2020-01-28 DIAGNOSIS — O99.213 OBESITY AFFECTING PREGNANCY IN THIRD TRIMESTER: ICD-10-CM

## 2020-01-28 DIAGNOSIS — Z98.891 HISTORY OF 2 CESAREAN SECTIONS: ICD-10-CM

## 2020-01-28 DIAGNOSIS — O09.93 SUPERVISION OF HIGH RISK PREGNANCY IN THIRD TRIMESTER: ICD-10-CM

## 2020-01-28 DIAGNOSIS — K59.00 CONSTIPATION DURING PREGNANCY IN THIRD TRIMESTER: ICD-10-CM

## 2020-01-28 DIAGNOSIS — O44.00 PLACENTA PREVIA ANTEPARTUM: ICD-10-CM

## 2020-01-28 DIAGNOSIS — O99.019 MATERNAL ANEMIA IN PREGNANCY, ANTEPARTUM: ICD-10-CM

## 2020-01-28 DIAGNOSIS — O99.613 CONSTIPATION DURING PREGNANCY IN THIRD TRIMESTER: ICD-10-CM

## 2020-01-28 DIAGNOSIS — O99.333 TOBACCO SMOKING AFFECTING PREGNANCY IN THIRD TRIMESTER: ICD-10-CM

## 2020-01-28 DIAGNOSIS — Z34.83 PRENATAL CARE, SUBSEQUENT PREGNANCY IN THIRD TRIMESTER: Primary | ICD-10-CM

## 2020-01-28 PROBLEM — Z34.90 SUPERVISION OF NORMAL PREGNANCY: Status: RESOLVED | Noted: 2017-02-20 | Resolved: 2020-01-28

## 2020-01-28 PROBLEM — O09.90 HIGH-RISK PREGNANCY SUPERVISION: Status: ACTIVE | Noted: 2020-01-28

## 2020-01-28 LAB
BILIRUB BLD-MCNC: NEGATIVE MG/DL
GLUCOSE UR STRIP-MCNC: ABNORMAL MG/DL
KETONES UR QL: NEGATIVE
LEUKOCYTE EST, POC: ABNORMAL
NITRITE UR-MCNC: NEGATIVE MG/ML
PH UR: 7 [PH] (ref 5–8)
PROT UR STRIP-MCNC: NEGATIVE MG/DL
RBC # UR STRIP: NEGATIVE /UL
SP GR UR: 1.01 (ref 1–1.03)
UROBILINOGEN UR QL: NORMAL

## 2020-01-28 PROCEDURE — 99214 OFFICE O/P EST MOD 30 MIN: CPT | Performed by: OBSTETRICS & GYNECOLOGY

## 2020-01-28 RX ORDER — SODIUM CHLORIDE 9 MG/ML
250 INJECTION, SOLUTION INTRAVENOUS ONCE
Status: CANCELLED | OUTPATIENT
Start: 2020-01-29

## 2020-01-28 RX ORDER — SENNA AND DOCUSATE SODIUM 50; 8.6 MG/1; MG/1
1 TABLET, FILM COATED ORAL 2 TIMES DAILY
Qty: 60 TABLET | Refills: 1 | Status: SHIPPED | OUTPATIENT
Start: 2020-01-28 | End: 2020-12-08

## 2020-01-28 RX ORDER — POLYETHYLENE GLYCOL 3350 17 G/17G
17 POWDER, FOR SOLUTION ORAL DAILY PRN
Qty: 10 EACH | Refills: 1 | Status: SHIPPED | OUTPATIENT
Start: 2020-01-28 | End: 2020-04-07

## 2020-01-28 NOTE — PROGRESS NOTES
Chief Complaint   Patient presents with   • Routine Prenatal Visit     cc:constipation since taking iron suppl,  patient added magnesium not having as much leg cramps       HPI: 29 y.o.  at 28w3d presents for prenatal care after seeing MFM for consult. Patient denies vaginal bleeding, ctx or loss of fluid. +FM      Vitals:    20 1130   BP: 125/74   Weight: 126 kg (278 lb)       ROS:  GI:  Negative  : neg  Pulmonary: Negative     A/P  1. Intrauterine pregnancy at 28w3d   2. Pregnancy Risk:  HIGH RISK    Debra was seen today for routine prenatal visit.    Diagnoses and all orders for this visit:    Prenatal care, subsequent pregnancy in third trimester    Screening for blood or protein in urine  -     POC Urinalysis Dipstick    Obesity affecting pregnancy in third trimester    Maternal anemia in pregnancy, antepartum    History of 2  sections    Placenta previa antepartum    Tobacco smoking affecting pregnancy in third trimester    Constipation during pregnancy in third trimester  -     polyethylene glycol (MIRALAX) packet; Take 17 g by mouth Daily As Needed (constipation).  -     senna-docusate sodium (SENOKOT-S) 8.6-50 MG tablet; Take 1 tablet by mouth 2 (Two) Times a Day.    Morbidly adherent placenta, antepartum        -----------------------  PLAN:   Return in about 2 weeks (around 2020), or ob check .  Anemia- will schedule IV iron   Anterior Placenta previa with history of two prior  sections - s/p MFM consult with recommendation of hysterectomy at time of  section - Timing btw 34-36 weeks -Repeat US at [facility] on 20     OBESITY- 37 pound weight gain   Tobacco use - encouraged cessation   Strict pelvic rest   Discussed reports with patient and will await second US report  PTL warnings  Counseling was given to patient for the following topics: diagnostic results, instructions for management and prognosis . Total time of the encounter was 25 minutes and 20 minutes  was spend counseling.      Rosario Kearns MD  1/28/2020 5:03 PM

## 2020-01-29 ENCOUNTER — TELEPHONE (OUTPATIENT)
Dept: OBSTETRICS AND GYNECOLOGY | Age: 30
End: 2020-01-29

## 2020-01-29 NOTE — TELEPHONE ENCOUNTER
CALLED PT REGARDING THE NEED FOR IRON INFUSIONS. SHE STATES SHE DOES NOT WANT TO GO FOR THESE. SHE WANTS TO CONTINUE HER ORAL IRON. STATES SHE HAS NO TRANSPORTATION TO [place] 1 WK X3.  SHE STATES SHE IS NOT ABLE TO GO TO [place] FOR ALL THESE APPTS.   HESHAM

## 2020-01-30 ENCOUNTER — TELEPHONE (OUTPATIENT)
Dept: OBSTETRICS AND GYNECOLOGY | Age: 30
End: 2020-01-30

## 2020-01-31 NOTE — TELEPHONE ENCOUNTER
Called patient to discuss IV Iron infusions - she is willing to get iron infusions if they can be in [place] - will try to get at [facility] in [place].      Discussed severity of situation with patient and reminded her of strict pelvic rest and to go immediately to L&D fo any bleeding.     Discussed rec of c-hyst at time of delivery and explained what this meant. Patient states she will not de delivered at [facility].

## 2020-02-03 DIAGNOSIS — O44.00 PLACENTA PREVIA ANTEPARTUM: ICD-10-CM

## 2020-02-03 DIAGNOSIS — O99.019 MATERNAL ANEMIA IN PREGNANCY, ANTEPARTUM: Primary | ICD-10-CM

## 2020-02-04 ENCOUNTER — LAB (OUTPATIENT)
Dept: OBSTETRICS AND GYNECOLOGY | Age: 30
End: 2020-02-04

## 2020-02-04 DIAGNOSIS — O44.00 PLACENTA PREVIA ANTEPARTUM: ICD-10-CM

## 2020-02-04 DIAGNOSIS — O99.019 MATERNAL ANEMIA IN PREGNANCY, ANTEPARTUM: ICD-10-CM

## 2020-02-05 LAB
BASOPHILS # BLD AUTO: 0 X10E3/UL (ref 0–0.2)
BASOPHILS NFR BLD AUTO: 0 %
EOSINOPHIL # BLD AUTO: 0.2 X10E3/UL (ref 0–0.4)
EOSINOPHIL NFR BLD AUTO: 1 %
ERYTHROCYTE [DISTWIDTH] IN BLOOD BY AUTOMATED COUNT: 12.2 % (ref 11.7–15.4)
FERRITIN SERPL-MCNC: 19 NG/ML (ref 15–150)
HCT VFR BLD AUTO: 34.5 % (ref 34–46.6)
HGB BLD-MCNC: 11.2 G/DL (ref 11.1–15.9)
IMM GRANULOCYTES # BLD AUTO: 0.1 X10E3/UL (ref 0–0.1)
IMM GRANULOCYTES NFR BLD AUTO: 1 %
IRON SATN MFR SERPL: 26 % (ref 15–55)
IRON SERPL-MCNC: 117 UG/DL (ref 27–159)
LYMPHOCYTES # BLD AUTO: 2.6 X10E3/UL (ref 0.7–3.1)
LYMPHOCYTES NFR BLD AUTO: 21 %
MCH RBC QN AUTO: 29.9 PG (ref 26.6–33)
MCHC RBC AUTO-ENTMCNC: 32.5 G/DL (ref 31.5–35.7)
MCV RBC AUTO: 92 FL (ref 79–97)
MONOCYTES # BLD AUTO: 0.6 X10E3/UL (ref 0.1–0.9)
MONOCYTES NFR BLD AUTO: 4 %
NEUTROPHILS # BLD AUTO: 9 X10E3/UL (ref 1.4–7)
NEUTROPHILS NFR BLD AUTO: 73 %
PLATELET # BLD AUTO: 402 X10E3/UL (ref 150–450)
RBC # BLD AUTO: 3.74 X10E6/UL (ref 3.77–5.28)
TIBC SERPL-MCNC: 446 UG/DL (ref 250–450)
UIBC SERPL-MCNC: 329 UG/DL (ref 131–425)
WBC # BLD AUTO: 12.5 X10E3/UL (ref 3.4–10.8)

## 2020-02-11 ENCOUNTER — ROUTINE PRENATAL (OUTPATIENT)
Dept: OBSTETRICS AND GYNECOLOGY | Age: 30
End: 2020-02-11

## 2020-02-11 VITALS — BODY MASS INDEX: 40.91 KG/M2 | WEIGHT: 277 LBS | DIASTOLIC BLOOD PRESSURE: 74 MMHG | SYSTOLIC BLOOD PRESSURE: 122 MMHG

## 2020-02-11 DIAGNOSIS — O99.019 MATERNAL ANEMIA IN PREGNANCY, ANTEPARTUM: ICD-10-CM

## 2020-02-11 DIAGNOSIS — O99.333 TOBACCO SMOKING AFFECTING PREGNANCY IN THIRD TRIMESTER: ICD-10-CM

## 2020-02-11 DIAGNOSIS — Z98.891 HISTORY OF 2 CESAREAN SECTIONS: ICD-10-CM

## 2020-02-11 DIAGNOSIS — O99.213 OBESITY AFFECTING PREGNANCY IN THIRD TRIMESTER: ICD-10-CM

## 2020-02-11 DIAGNOSIS — O44.00 PLACENTA PREVIA ANTEPARTUM: ICD-10-CM

## 2020-02-11 DIAGNOSIS — Z13.89 SCREENING FOR BLOOD OR PROTEIN IN URINE: ICD-10-CM

## 2020-02-11 DIAGNOSIS — O09.93 SUPERVISION OF HIGH RISK PREGNANCY IN THIRD TRIMESTER: Primary | ICD-10-CM

## 2020-02-11 LAB
BILIRUB BLD-MCNC: NEGATIVE MG/DL
GLUCOSE UR STRIP-MCNC: NEGATIVE MG/DL
KETONES UR QL: NEGATIVE
LEUKOCYTE EST, POC: NEGATIVE
NITRITE UR-MCNC: NEGATIVE MG/ML
PH UR: 6.5 [PH] (ref 5–8)
PROT UR STRIP-MCNC: NEGATIVE MG/DL
RBC # UR STRIP: NEGATIVE /UL
SP GR UR: 1.01 (ref 1–1.03)
UROBILINOGEN UR QL: NORMAL

## 2020-02-11 PROCEDURE — 99214 OFFICE O/P EST MOD 30 MIN: CPT | Performed by: OBSTETRICS & GYNECOLOGY

## 2020-02-11 NOTE — PROGRESS NOTES
Chief Complaint   Patient presents with   • Routine Prenatal Visit     cc: iron infusion this morning , appt with dr willard 20       HPI: 29 y.o.  at 30w3d presents for prenatal care - denies ctx, loss of fluid or vag bleeding +FM      Vitals:    20 1221   BP: 122/74   Weight: 126 kg (277 lb)       ROS:  GI:  Negative  : neg  Pulmonary: Negative     A/P  1. Intrauterine pregnancy at 30w3d   2. Pregnancy Risk:  HIGH RISK    Debra was seen today for routine prenatal visit.    Diagnoses and all orders for this visit:    Supervision of high risk pregnancy in third trimester    Screening for blood or protein in urine  -     POC Urinalysis Dipstick    Obesity affecting pregnancy in third trimester    History of 2  sections    Tobacco smoking affecting pregnancy in third trimester    Placenta previa antepartum    Maternal anemia in pregnancy, antepartum    Morbidly adherent placenta, antepartum        -----------------------  PLAN:   Return in about 2 weeks (around 2020), or ob check .  Morbidly adherent placenta- US on 20 with Dr. Willard -   Lengthy discussion with patient and mother regarding placenta accreta and plan for c-hyst and possible need for MRI for planning.   S/p IV Iron x 1 - will get 2 more   Strict bleeding warnings   PTL warnings       2 weeks   Counseling was given to patient for the following topics: instructions for management, risk factor reductions and prognosis . Total time of the encounter was 25 minutes and 20 minutes was spend counseling.    Rosario Kearns MD  2020 1:16 PM

## 2020-02-25 ENCOUNTER — ROUTINE PRENATAL (OUTPATIENT)
Dept: OBSTETRICS AND GYNECOLOGY | Age: 30
End: 2020-02-25

## 2020-02-25 VITALS — BODY MASS INDEX: 42.53 KG/M2 | WEIGHT: 288 LBS | SYSTOLIC BLOOD PRESSURE: 126 MMHG | DIASTOLIC BLOOD PRESSURE: 78 MMHG

## 2020-02-25 DIAGNOSIS — O99.213 OBESITY AFFECTING PREGNANCY IN THIRD TRIMESTER: ICD-10-CM

## 2020-02-25 DIAGNOSIS — O99.613 CONSTIPATION DURING PREGNANCY IN THIRD TRIMESTER: ICD-10-CM

## 2020-02-25 DIAGNOSIS — K59.00 CONSTIPATION DURING PREGNANCY IN THIRD TRIMESTER: ICD-10-CM

## 2020-02-25 DIAGNOSIS — O44.00 PLACENTA PREVIA ANTEPARTUM: ICD-10-CM

## 2020-02-25 DIAGNOSIS — Z34.83 PRENATAL CARE, SUBSEQUENT PREGNANCY IN THIRD TRIMESTER: ICD-10-CM

## 2020-02-25 DIAGNOSIS — O99.019 MATERNAL ANEMIA IN PREGNANCY, ANTEPARTUM: ICD-10-CM

## 2020-02-25 DIAGNOSIS — Z13.89 SCREENING FOR BLOOD OR PROTEIN IN URINE: Primary | ICD-10-CM

## 2020-02-25 DIAGNOSIS — O99.333 TOBACCO SMOKING AFFECTING PREGNANCY IN THIRD TRIMESTER: ICD-10-CM

## 2020-02-25 DIAGNOSIS — O09.93 SUPERVISION OF HIGH RISK PREGNANCY IN THIRD TRIMESTER: ICD-10-CM

## 2020-02-25 DIAGNOSIS — Z98.891 HISTORY OF 2 CESAREAN SECTIONS: ICD-10-CM

## 2020-02-25 LAB
BILIRUB BLD-MCNC: NEGATIVE MG/DL
CLARITY, POC: CLEAR
COLOR UR: YELLOW
GLUCOSE UR STRIP-MCNC: NEGATIVE MG/DL
KETONES UR QL: NEGATIVE
LEUKOCYTE EST, POC: NEGATIVE
NITRITE UR-MCNC: NEGATIVE MG/ML
PH UR: 7 [PH] (ref 5–8)
PROT UR STRIP-MCNC: NEGATIVE MG/DL
RBC # UR STRIP: NEGATIVE /UL
SP GR UR: 1.01 (ref 1–1.03)
UROBILINOGEN UR QL: NORMAL

## 2020-02-25 PROCEDURE — 99213 OFFICE O/P EST LOW 20 MIN: CPT | Performed by: OBSTETRICS & GYNECOLOGY

## 2020-02-25 NOTE — PROGRESS NOTES
Chief Complaint   Patient presents with   • Routine Prenatal Visit     32w3d, no cc       HPI: 29 y.o.  at 32w3d presents for prenatal care - tearful due to anxiety about delivery - ultrasound tomorrow with Dr. Maravilla   Vitals:    20 1204   BP: 126/78   Weight: 131 kg (288 lb)       ROS:   Gen neg  GI: neg  CV: neg  Pul: neg  Neuro:neg         A/P  1. Intrauterine pregnancy at 32w3d   2. Pregnancy Risk:  HIGH RISK    Debra was seen today for routine prenatal visit.    Diagnoses and all orders for this visit:    Screening for blood or protein in urine  -     POC Urinalysis Dipstick    Constipation during pregnancy in third trimester    Obesity affecting pregnancy in third trimester    Tobacco smoking affecting pregnancy in third trimester    Maternal anemia in pregnancy, antepartum    Morbidly adherent placenta, antepartum    Supervision of high risk pregnancy in third trimester    History of 2  sections    Placenta previa antepartum    Prenatal care, subsequent pregnancy in third trimester        -----------------------  PLAN:   Return in about 1 week (around 3/3/2020), or ob check and BPP.   Morbidly adherent placenta- repeat US tomorrow with DR. Maravilla to determine timing of delivery   Obesity - with 47 pound weight gain -   Prior  x 2 - will schedule RLTCS   PTL warnings          Rosario Kearns MD  2020 1:04 PM

## 2020-03-02 ENCOUNTER — TELEPHONE (OUTPATIENT)
Dept: OBSTETRICS AND GYNECOLOGY | Age: 30
End: 2020-03-02

## 2020-03-02 NOTE — TELEPHONE ENCOUNTER
called patient to discuss new findings of placenta percreta - informed patient that I discussed these new findings with two MFM's today, Dr. Alves and Dr. Yin and they both recommend delivery at [facility] in order for patient to be treated at a tertiary care center with access to Gyn Onc, Interventional radiology and Urology. At first patient was resistant and then agreed to go to [facility] today for admission as recommended by Dr. Yin. Patient was given my cell and told she can call me with any questions or concerns at any time.

## 2020-03-05 ENCOUNTER — TELEPHONE (OUTPATIENT)
Dept: OBSTETRICS AND GYNECOLOGY | Age: 30
End: 2020-03-05

## 2020-03-09 ENCOUNTER — TELEPHONE (OUTPATIENT)
Dept: OBSTETRICS AND GYNECOLOGY | Age: 30
End: 2020-03-09

## 2020-03-18 ENCOUNTER — TELEPHONE (OUTPATIENT)
Dept: OBSTETRICS AND GYNECOLOGY | Age: 30
End: 2020-03-18
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
## 2020-03-24 ENCOUNTER — POSTPARTUM VISIT (OUTPATIENT)
Dept: OBSTETRICS AND GYNECOLOGY | Age: 30
End: 2020-03-24

## 2020-03-24 VITALS
DIASTOLIC BLOOD PRESSURE: 68 MMHG | BODY MASS INDEX: 40.14 KG/M2 | WEIGHT: 271 LBS | HEIGHT: 69 IN | SYSTOLIC BLOOD PRESSURE: 120 MMHG

## 2020-03-24 DIAGNOSIS — O99.019 MATERNAL ANEMIA IN PREGNANCY, ANTEPARTUM: ICD-10-CM

## 2020-03-24 DIAGNOSIS — L08.9 SUPERFICIAL BACTERIAL SKIN INFECTION: Primary | ICD-10-CM

## 2020-03-24 DIAGNOSIS — B96.89 SUPERFICIAL BACTERIAL SKIN INFECTION: Primary | ICD-10-CM

## 2020-03-24 DIAGNOSIS — K64.4 EXTERNAL HEMORRHOIDS: ICD-10-CM

## 2020-03-24 DIAGNOSIS — O44.00 PLACENTA PREVIA ANTEPARTUM: ICD-10-CM

## 2020-03-24 PROBLEM — O09.90 HIGH-RISK PREGNANCY SUPERVISION: Status: RESOLVED | Noted: 2020-01-28 | Resolved: 2020-03-24

## 2020-03-24 PROBLEM — O99.210 OBESITY COMPLICATING PREGNANCY: Status: RESOLVED | Noted: 2017-09-14 | Resolved: 2020-03-24

## 2020-03-24 PROBLEM — O99.330 TOBACCO SMOKING AFFECTING PREGNANCY: Status: RESOLVED | Noted: 2017-02-20 | Resolved: 2020-03-24

## 2020-03-24 PROBLEM — Z98.891 HISTORY OF 2 CESAREAN SECTIONS: Status: RESOLVED | Noted: 2017-02-20 | Resolved: 2020-03-24

## 2020-03-24 PROBLEM — O99.613 CONSTIPATION DURING PREGNANCY IN THIRD TRIMESTER: Status: RESOLVED | Noted: 2020-01-28 | Resolved: 2020-03-24

## 2020-03-24 PROBLEM — K59.00 CONSTIPATION DURING PREGNANCY IN THIRD TRIMESTER: Status: RESOLVED | Noted: 2020-01-28 | Resolved: 2020-03-24

## 2020-03-24 PROCEDURE — 99214 OFFICE O/P EST MOD 30 MIN: CPT | Performed by: OBSTETRICS & GYNECOLOGY

## 2020-03-24 RX ORDER — SULFAMETHOXAZOLE AND TRIMETHOPRIM 800; 160 MG/1; MG/1
1 TABLET ORAL 2 TIMES DAILY
Qty: 14 TABLET | Refills: 0 | Status: SHIPPED | OUTPATIENT
Start: 2020-03-24 | End: 2020-03-31

## 2020-03-24 RX ORDER — OXYCODONE HYDROCHLORIDE AND ACETAMINOPHEN 5; 325 MG/1; MG/1
1 TABLET ORAL EVERY 4 HOURS PRN
Qty: 20 TABLET | Refills: 0 | Status: SHIPPED | OUTPATIENT
Start: 2020-03-24 | End: 2020-04-07

## 2020-03-24 NOTE — PROGRESS NOTES
"Subjective   Debra Moreno is a 29 y.o. female who presents for a postpartum visit. She is 2 weeks postpartum following a  hysterectomy. I have fully reviewed the prenatal and intrapartum course. The delivery was at 34-3 gestational weeks. Outcome:  hysterectomy . Anesthesia: general. Postpartum course has been c/b depression. Baby's course has been c/b prematurity. Baby is feeding by bottle. Bleeding no bleeding. Bowel function is normal. Bladder function is normal. Patient is not sexually active. Contraception method is hysterectomy. Postpartum depression screening: positive.    The following portions of the patient's history were reviewed and updated as appropriate: allergies, current medications, past family history, past medical history, past social history, past surgical history and problem list.    Review of Systems  Pertinent items are noted in HPI.    Objective   /68   Ht 175.3 cm (69\")   Wt 123 kg (271 lb)   LMP 2019 (Approximate)   Breastfeeding No   BMI 40.02 kg/m²    General:  alert, appears stated age and cooperative   Abdomen: soft, non-tender; bowel sounds normal; no masses,  no organomegaly and Vertical incision healing with areas of erythema under pannus     Assessment/Plan   postpartum exam. Pap smear not done at today's visit.    1. Contraception: hysterectomy   2. Superficial skin infection - Bactrim DS x 7 days   3. Follow up in: 2 weeks or as needed.  4. PPD - start Zoloft  5. Hemorrhoids - Anusol Rx       Counseling was given to patient for the following topics: instructions for management, risk factor reductions, impressions, risks and benefits of treatment options and importance of treatment compliance . Total time of the encounter was 25 minutes and 20 minutes was spend counseling.           "

## 2020-03-27 ENCOUNTER — OFFICE VISIT (OUTPATIENT)
Dept: OBSTETRICS AND GYNECOLOGY | Age: 30
End: 2020-03-27

## 2020-03-27 VITALS
WEIGHT: 260 LBS | BODY MASS INDEX: 38.51 KG/M2 | TEMPERATURE: 98.8 F | DIASTOLIC BLOOD PRESSURE: 70 MMHG | HEIGHT: 69 IN | SYSTOLIC BLOOD PRESSURE: 120 MMHG

## 2020-03-27 PROCEDURE — 99213 OFFICE O/P EST LOW 20 MIN: CPT | Performed by: NURSE PRACTITIONER

## 2020-03-27 NOTE — PROGRESS NOTES
Patient presents for a postpartum visit, incision check She is 17 days postpartum following a fundal vertical  hysterectomy. I have fully reviewed the prenatal and intrapartum course. The delivery was at 34 gestational weeks. Bleeding thin lochia. Bowel function is normal. Bladder function is normal. Patient is not sexually active. Contraception method is status post hysterectomy.     Debra was started on bactrim on Tuesday for superficial skin infection. Debra believes the area looks much improved. No concerns regarding the incision. She continues on the bactrim. No fever/chills.     The following portions of the patient's history were reviewed and updated as appropriate: allergies, current medications, past family history, past medical history, past social history, past surgical history and problem list.    Review of Systems  Pertinent items are noted in HPI.        Vitals:    20 1030   BP: 120/70   Temp: 98.8 °F (37.1 °C)       General:  alert, appears stated age and cooperative    Breasts:  deferred   Lungs: clear to auscultation bilaterally   Heart:  regular rate and rhythm, S1, S2 normal, no murmur, click, rub or gallop   Abdomen: soft, non-tender; bowel sounds normal; no masses,  no organomegaly and vertical incision CDI, no s/s infection- no erythema, drainage, tenderness        No pain, erythema, swelling in LE bilaterally                         Incision looks good. Dr. Ragsdale also examined and agrees. Plan to keep follow up appointment with Dr. Kearns. Complete bactrim therapy. Call for any changes or concerns. Infection warnings reinforced    ÁNGEL Sung

## 2020-04-07 ENCOUNTER — POSTPARTUM VISIT (OUTPATIENT)
Dept: OBSTETRICS AND GYNECOLOGY | Age: 30
End: 2020-04-07

## 2020-04-07 VITALS
BODY MASS INDEX: 38.66 KG/M2 | HEIGHT: 69 IN | DIASTOLIC BLOOD PRESSURE: 70 MMHG | SYSTOLIC BLOOD PRESSURE: 127 MMHG | WEIGHT: 261 LBS

## 2020-04-07 PROBLEM — B96.89 SUPERFICIAL BACTERIAL SKIN INFECTION: Status: RESOLVED | Noted: 2020-03-24 | Resolved: 2020-04-07

## 2020-04-07 PROBLEM — L08.9 SUPERFICIAL BACTERIAL SKIN INFECTION: Status: RESOLVED | Noted: 2020-03-24 | Resolved: 2020-04-07

## 2020-04-07 NOTE — PROGRESS NOTES
"Subjective   Debra Moreno is a 29 y.o. female who presents for a postpartum visit. She is 4 weeks postpartum following a  hysterectomy at [facility].  I have fully reviewed the prenatal and intrapartum course. The delivery was at 34-3 gestational weeks. Outcome:  hysterectomy . Anesthesia: general. Postpartum course has been c/b depression. Baby's course has been c/b prematurity. Baby is feeding by bottle. Bleeding no bleeding. Bowel function is normal. Bladder function is normal. Patient is not sexually active. Contraception method is hyst. Postpartum depression screening: negative.    The following portions of the patient's history were reviewed and updated as appropriate: allergies, current medications, past family history, past medical history, past social history, past surgical history and problem list.    Review of Systems  Pertinent items are noted in HPI.    Objective   /70   Ht 175.3 cm (69\")   Wt 118 kg (261 lb)   LMP 2019 (Approximate)   Breastfeeding No   BMI 38.54 kg/m²    General:  alert, appears stated age and cooperative   Abdomen: soft, non-tender; bowel sounds normal; no masses,  no organomegaly and Inc healing well no s/s infection      Assessment/Plan   postpartum exam. Pap smear not done at today's visit.    1. Contraception: hyst  2. PPD - stable on Zoloft   3. Follow up in: 3 weeks or as needed.  Counseling was given to patient for the following topics: instructions for management, risk factor reductions and importance of treatment compliance . Total time of the encounter was 20 minutes and 15 minutes was spend counseling.             "

## 2020-12-08 ENCOUNTER — OFFICE VISIT (OUTPATIENT)
Dept: OBSTETRICS AND GYNECOLOGY | Age: 30
End: 2020-12-08

## 2020-12-08 VITALS
BODY MASS INDEX: 43.4 KG/M2 | HEIGHT: 69 IN | SYSTOLIC BLOOD PRESSURE: 120 MMHG | WEIGHT: 293 LBS | DIASTOLIC BLOOD PRESSURE: 80 MMHG

## 2020-12-08 DIAGNOSIS — R63.5 WEIGHT GAIN, ABNORMAL: ICD-10-CM

## 2020-12-08 PROBLEM — K64.4 EXTERNAL HEMORRHOIDS: Status: RESOLVED | Noted: 2020-03-24 | Resolved: 2020-12-08

## 2020-12-08 PROCEDURE — 99214 OFFICE O/P EST MOD 30 MIN: CPT | Performed by: OBSTETRICS & GYNECOLOGY

## 2020-12-08 RX ORDER — FLUOXETINE HYDROCHLORIDE 20 MG/1
20 CAPSULE ORAL DAILY
Qty: 90 CAPSULE | Refills: 3 | Status: SHIPPED | OUTPATIENT
Start: 2020-12-08 | End: 2021-01-12

## 2020-12-08 NOTE — PROGRESS NOTES
"Subjective   Debra Moreno is a 30 y.o. female . cc: problem visit , pt c/o being stressed , very emotional , hot flashes , weight gain, taking care of 73 yrs old grandmother which is alot .     History of Present Illness    The following portions of the patient's history were reviewed and updated as appropriate: allergies, current medications, past family history, past medical history, past social history, past surgical history and problem list.    Review of Systems   Constitutional: Positive for unexpected weight change. Negative for chills, fatigue and fever.   Gastrointestinal: Negative for abdominal distention and abdominal pain.   Endocrine: Positive for heat intolerance.   Genitourinary: Negative for menstrual problem, pelvic pain, vaginal bleeding, vaginal discharge and vaginal pain.   Psychiatric/Behavioral: Positive for dysphoric mood. The patient is nervous/anxious.    All other systems reviewed and are negative.    /80   Ht 175.3 cm (69\")   Wt 133 kg (294 lb)   LMP  (LMP Unknown)   Breastfeeding No   BMI 43.42 kg/m²     Objective   Physical Exam  Vitals signs and nursing note reviewed.   Constitutional:       Appearance: Normal appearance. She is obese.   Neurological:      Mental Status: She is alert.   Psychiatric:         Mood and Affect: Mood is anxious and depressed. Affect is angry.         Behavior: Behavior normal.         Assessment/Plan   Diagnoses and all orders for this visit:    1. Postpartum depression (Primary)  -     FLUoxetine (PROzac) 20 MG capsule; Take 1 capsule by mouth Daily.  Dispense: 90 capsule; Refill: 3    2. Weight gain, abnormal  -     TSH  -     Estradiol  -     Follicle Stimulating Hormone       Counseling was given to patient for the following topics: instructions for management, impressions, risks and benefits of treatment options and importance of treatment compliance . Total time of the encounter was 25 minutes and 20 minutes was spend " counseling.    Return in about 5 weeks (around 1/12/2021), or f/u depression.

## 2020-12-09 LAB
ESTRADIOL SERPL-MCNC: 30.2 PG/ML
FSH SERPL-ACNC: 4.3 MIU/ML
TSH SERPL DL<=0.005 MIU/L-ACNC: 0.43 UIU/ML (ref 0.45–4.5)

## 2021-01-12 ENCOUNTER — OFFICE VISIT (OUTPATIENT)
Dept: OBSTETRICS AND GYNECOLOGY | Age: 31
End: 2021-01-12

## 2021-01-12 VITALS — HEIGHT: 69 IN | WEIGHT: 279 LBS | BODY MASS INDEX: 41.32 KG/M2

## 2021-01-12 DIAGNOSIS — Z01.419 ENCOUNTER FOR GYNECOLOGICAL EXAMINATION WITHOUT ABNORMAL FINDING: Primary | ICD-10-CM

## 2021-01-12 DIAGNOSIS — L98.9 ABNORMAL SKIN OF VULVA: ICD-10-CM

## 2021-01-12 DIAGNOSIS — A59.01 TRICHOMONAL VAGINITIS: ICD-10-CM

## 2021-01-12 DIAGNOSIS — Z11.3 SCREEN FOR STD (SEXUALLY TRANSMITTED DISEASE): ICD-10-CM

## 2021-01-12 PROBLEM — R63.5 WEIGHT GAIN, ABNORMAL: Status: RESOLVED | Noted: 2020-12-08 | Resolved: 2021-01-12

## 2021-01-12 PROCEDURE — 99395 PREV VISIT EST AGE 18-39: CPT | Performed by: OBSTETRICS & GYNECOLOGY

## 2021-01-12 RX ORDER — FLUOXETINE HYDROCHLORIDE 40 MG/1
40 CAPSULE ORAL DAILY
Qty: 90 CAPSULE | Refills: 3 | Status: SHIPPED | OUTPATIENT
Start: 2021-01-12 | End: 2021-05-07 | Stop reason: SDUPTHER

## 2021-01-13 LAB
HBV SURFACE AG SERPL QL IA: NEGATIVE
HCV AB S/CO SERPL IA: 0.1 S/CO RATIO (ref 0–0.9)
HIV 1+2 AB+HIV1 P24 AG SERPL QL IA: NON REACTIVE
RPR SER QL: NON REACTIVE

## 2021-01-13 NOTE — PROGRESS NOTES
Please call patient and notify of negative results of bloodwork for STDs - HIV, Syphilis, Hep C and Hep B - vaginal swab still pending

## 2021-01-15 ENCOUNTER — TELEPHONE (OUTPATIENT)
Dept: OBSTETRICS AND GYNECOLOGY | Age: 31
End: 2021-01-15

## 2021-01-15 DIAGNOSIS — A59.01 TRICHOMONAL VAGINITIS: ICD-10-CM

## 2021-01-15 LAB
A VAGINAE DNA VAG QL NAA+PROBE: ABNORMAL SCORE
BVAB2 DNA VAG QL NAA+PROBE: ABNORMAL SCORE
C ALBICANS DNA VAG QL NAA+PROBE: NEGATIVE
C GLABRATA DNA VAG QL NAA+PROBE: NEGATIVE
C TRACH DNA VAG QL NAA+PROBE: NEGATIVE
MEGA1 DNA VAG QL NAA+PROBE: ABNORMAL SCORE
N GONORRHOEA DNA VAG QL NAA+PROBE: NEGATIVE
T VAGINALIS DNA VAG QL NAA+PROBE: POSITIVE

## 2021-01-15 RX ORDER — METRONIDAZOLE 500 MG/1
2000 TABLET ORAL ONCE
Qty: 4 TABLET | Refills: 0 | Status: SHIPPED | OUTPATIENT
Start: 2021-01-15 | End: 2021-01-15 | Stop reason: SDUPTHER

## 2021-01-15 RX ORDER — METRONIDAZOLE 500 MG/1
2000 TABLET ORAL ONCE
Qty: 4 TABLET | Refills: 1 | Status: SHIPPED | OUTPATIENT
Start: 2021-01-15 | End: 2021-01-15

## 2021-01-15 NOTE — TELEPHONE ENCOUNTER
----- Message from Rosario Kearns MD sent at 1/15/2021  1:39 PM EST -----  Please call the patient regarding her abnormal result of trichomonas - easy to treat - Rx sent to pharmacy - partner needs treated before intercourse .

## 2021-01-15 NOTE — PROGRESS NOTES
Please call the patient regarding her abnormal result of trichomonas - easy to treat - Rx sent to pharmacy - partner needs treated before intercourse .

## 2021-01-15 NOTE — TELEPHONE ENCOUNTER
Results to pt. Pt states this is a new partner and when she got the labs back thought everything was ok. Pt asking if medication can be sent for partner as he does not have a doc or ins.Pt aware she will not get a return call to Monday.dn

## 2021-03-02 ENCOUNTER — TELEPHONE (OUTPATIENT)
Dept: OBSTETRICS AND GYNECOLOGY | Age: 31
End: 2021-03-02

## 2021-03-02 NOTE — TELEPHONE ENCOUNTER
Patient was scheduled at 10:45AM for a biopsy.  I called patient at 11:00AM and left voicemail for her to call office to reschedule appointment.

## 2021-05-06 ENCOUNTER — TELEPHONE (OUTPATIENT)
Dept: OBSTETRICS AND GYNECOLOGY | Age: 31
End: 2021-05-06

## 2021-05-06 NOTE — TELEPHONE ENCOUNTER
"Patient called wanting to know if Dr. Kearns will increase her Prozac 40mg.  She states her anxiety is \"through the roof\". Patient denies any thoughts of self harm or harm to others.  She states she just feels overwhelmed and panicky. [facility] been [facility] with patient.  "

## 2021-05-07 RX ORDER — FLUOXETINE HYDROCHLORIDE 20 MG/1
60 CAPSULE ORAL DAILY
Qty: 90 CAPSULE | Refills: 12 | Status: SHIPPED | OUTPATIENT
Start: 2021-05-07

## 2021-10-14 NOTE — TELEPHONE ENCOUNTER
I have been holding on to this patient to make sure she scheduled a vulvar bx, She has currently no working #. She was scheduled for annual on 1/18/22 but no one updated phone #.dn, but has now rescheduled for 2/22/22, however phone # was not updated.prema

## 2025-07-29 NOTE — ADDENDUM NOTE
Addended by: CANDICE APARICIO on: 8/28/2019 01:24 PM     Modules accepted: Orders    
Alert and oriented to person, place and time

## (undated) DEVICE — 3M™ MEDIPORE™ H SOFT CLOTH SURGICAL TAPE 2864, 4 INCH X 10 YARD (10CM X 9,14M), 12 ROLLS/CASE: Brand: 3M™ MEDIPORE™

## (undated) DEVICE — KT ART BLD GAS QUICK DRAW

## (undated) DEVICE — SUT VIC 2/0 CT1 36IN

## (undated) DEVICE — SUT MNCRYL 4/0 SH 27IN Y415H

## (undated) DEVICE — SUT VIC 0 CT1 36IN J946H

## (undated) DEVICE — SUT MONOCRYL PLS ANTIB UND 3/0  PS1 27IN

## (undated) DEVICE — STPLR SKIN VISISTAT WD 35CT

## (undated) DEVICE — SUT VIC 0 CTX 36IN J978H

## (undated) DEVICE — SUT MONOCRYL 2/0 SH 36IN Y732H

## (undated) DEVICE — SOL IRR H2O BTL 1000ML STRL

## (undated) DEVICE — SLV SCD KN ADJ EXPRSS LG

## (undated) DEVICE — GLV SURG BIOGEL LTX PF 7 1/2

## (undated) DEVICE — NDL BLNT 18G 1 1/2IN

## (undated) DEVICE — SUT VIC 3/0 SH 27IN J416H